# Patient Record
Sex: MALE | Race: WHITE | NOT HISPANIC OR LATINO | Employment: FULL TIME | URBAN - METROPOLITAN AREA
[De-identification: names, ages, dates, MRNs, and addresses within clinical notes are randomized per-mention and may not be internally consistent; named-entity substitution may affect disease eponyms.]

---

## 2023-08-16 ENCOUNTER — HOSPITAL ENCOUNTER (OUTPATIENT)
Dept: DATA CONVERSION | Facility: HOSPITAL | Age: 73
Discharge: HOME | End: 2023-08-16
Payer: MEDICARE

## 2023-08-16 DIAGNOSIS — Z79.899 OTHER LONG TERM (CURRENT) DRUG THERAPY: ICD-10-CM

## 2023-08-16 DIAGNOSIS — R20.0 ANESTHESIA OF SKIN: ICD-10-CM

## 2023-08-16 DIAGNOSIS — I67.1 CEREBRAL ANEURYSM, NONRUPTURED (HHS-HCC): ICD-10-CM

## 2023-08-16 LAB
ALBUMIN SERPL-MCNC: 3.6 GM/DL (ref 3.5–5)
ALBUMIN/GLOB SERPL: 1 RATIO (ref 1.5–3)
ALP BLD-CCNC: 80 U/L (ref 35–125)
ALT SERPL-CCNC: 26 U/L (ref 5–40)
ANION GAP SERPL CALCULATED.3IONS-SCNC: 10 MMOL/L (ref 0–19)
ANTICOAGULANT: NORMAL
AST SERPL-CCNC: 19 U/L (ref 5–40)
BACTERIA UR QL AUTO: NEGATIVE
BASOPHILS # BLD AUTO: 0.04 K/UL (ref 0–0.22)
BASOPHILS NFR BLD AUTO: 0.4 % (ref 0–1)
BILIRUB SERPL-MCNC: 1.9 MG/DL (ref 0.1–1.2)
BILIRUB UR QL STRIP.AUTO: NEGATIVE
BUN SERPL-MCNC: 24 MG/DL (ref 8–25)
BUN/CREAT SERPL: 17.1 RATIO (ref 8–21)
CALCIUM SERPL-MCNC: 8.6 MG/DL (ref 8.5–10.4)
CHLORIDE SERPL-SCNC: 95 MMOL/L (ref 97–107)
CLARITY UR: CLEAR
CO2 SERPL-SCNC: 22 MMOL/L (ref 24–31)
COLOR UR: YELLOW
CREAT SERPL-MCNC: 1.4 MG/DL (ref 0.4–1.6)
DEPRECATED RDW RBC AUTO: 43 FL (ref 37–54)
DIFFERENTIAL METHOD BLD: ABNORMAL
EOSINOPHIL # BLD AUTO: 0.03 K/UL (ref 0–0.45)
EOSINOPHIL NFR BLD: 0.3 % (ref 0–3)
ERYTHROCYTE [DISTWIDTH] IN BLOOD BY AUTOMATED COUNT: 12.6 % (ref 11.7–15)
GFR SERPL CREATININE-BSD FRML MDRD: 54 ML/MIN/1.73 M2
GLOBULIN SER-MCNC: 3.6 G/DL (ref 1.9–3.7)
GLUCOSE SERPL-MCNC: 113 MG/DL (ref 65–99)
GLUCOSE UR STRIP.AUTO-MCNC: NEGATIVE MG/DL
HCT VFR BLD AUTO: 32.4 % (ref 41–50)
HGB BLD-MCNC: 11.2 GM/DL (ref 13.5–16.5)
HGB UR QL STRIP.AUTO: ABNORMAL /HPF (ref 0–3)
HGB UR QL: NEGATIVE
HS TROPONIN T DELTA: 1 (ref 0–4)
HS TROPONIN T DELTA: NORMAL (ref 0–4)
HYALINE CASTS UR QL AUTO: ABNORMAL /LPF
IMM GRANULOCYTES # BLD AUTO: 0.07 K/UL (ref 0–0.1)
INR PPP: 1.1 (ref 0.86–1.16)
KETONES UR QL STRIP.AUTO: NEGATIVE
LEUKOCYTE ESTERASE UR QL STRIP.AUTO: NEGATIVE
LYMPHOCYTES # BLD AUTO: 0.77 K/UL (ref 1.2–3.2)
LYMPHOCYTES NFR BLD MANUAL: 8.6 % (ref 20–40)
MCH RBC QN AUTO: 32.5 PG (ref 26–34)
MCHC RBC AUTO-ENTMCNC: 34.6 % (ref 31–37)
MCV RBC AUTO: 93.9 FL (ref 80–100)
MICROSCOPIC (UA): ABNORMAL
MONOCYTES # BLD AUTO: 0.6 K/UL (ref 0–0.8)
MONOCYTES NFR BLD MANUAL: 6.7 % (ref 0–8)
NEUTROPHILS # BLD AUTO: 7.49 K/UL
NEUTROPHILS # BLD AUTO: 7.49 K/UL (ref 1.8–7.7)
NEUTROPHILS.IMMATURE NFR BLD: 0.8 % (ref 0–1)
NEUTS SEG NFR BLD: 83.2 % (ref 50–70)
NITRITE UR QL STRIP.AUTO: NEGATIVE
NRBC BLD-RTO: 0 /100 WBC
PH UR STRIP.AUTO: 5.5 [PH] (ref 4.6–8)
PLATELET # BLD AUTO: 342 K/UL (ref 150–450)
PMV BLD AUTO: 9.8 CU (ref 7–12.6)
POTASSIUM SERPL-SCNC: 3.9 MMOL/L (ref 3.4–5.1)
PROT SERPL-MCNC: 7.2 G/DL (ref 5.9–7.9)
PROT UR STRIP.AUTO-MCNC: NEGATIVE MG/DL
PROTHROMBIN TIME: 12 SEC (ref 9.3–12.7)
RBC # BLD AUTO: 3.45 M/UL (ref 4.5–5.5)
SODIUM SERPL-SCNC: 127 MMOL/L (ref 133–145)
SP GR UR STRIP.AUTO: 1.04 (ref 1–1.03)
SQUAMOUS UR QL AUTO: ABNORMAL /HPF
TROPONIN T SERPL-MCNC: 10 NG/L
TROPONIN T SERPL-MCNC: 9 NG/L
URINE CULTURE: ABNORMAL
UROBILINOGEN UR QL STRIP.AUTO: 2 MG/DL (ref 0–1)
WBC # BLD AUTO: 9 K/UL (ref 4.5–11)
WBC #/AREA URNS AUTO: ABNORMAL /HPF (ref 0–3)

## 2023-08-17 ENCOUNTER — HOSPITAL ENCOUNTER (OUTPATIENT)
Dept: DATA CONVERSION | Facility: HOSPITAL | Age: 73
Discharge: HOME | End: 2023-08-17
Payer: MEDICARE

## 2023-08-17 DIAGNOSIS — N40.1 BENIGN PROSTATIC HYPERPLASIA WITH LOWER URINARY TRACT SYMPTOMS: ICD-10-CM

## 2023-08-17 DIAGNOSIS — E55.9 VITAMIN D DEFICIENCY, UNSPECIFIED: ICD-10-CM

## 2023-08-17 DIAGNOSIS — E78.5 HYPERLIPIDEMIA, UNSPECIFIED: ICD-10-CM

## 2023-08-17 DIAGNOSIS — E03.9 HYPOTHYROIDISM, UNSPECIFIED: ICD-10-CM

## 2023-08-17 DIAGNOSIS — Z00.00 ENCOUNTER FOR GENERAL ADULT MEDICAL EXAMINATION WITHOUT ABNORMAL FINDINGS: ICD-10-CM

## 2023-08-17 DIAGNOSIS — R21 RASH AND OTHER NONSPECIFIC SKIN ERUPTION: ICD-10-CM

## 2023-08-17 DIAGNOSIS — I10 ESSENTIAL (PRIMARY) HYPERTENSION: ICD-10-CM

## 2023-08-17 DIAGNOSIS — R73.09 OTHER ABNORMAL GLUCOSE: ICD-10-CM

## 2023-08-17 LAB
25(OH)D3 SERPL-MCNC: 40 NG/ML (ref 31–100)
ALBUMIN SERPL-MCNC: 4.1 GM/DL (ref 3.5–5)
ALBUMIN/GLOB SERPL: 1 RATIO (ref 1.5–3)
ALP BLD-CCNC: 96 U/L (ref 35–125)
ALT SERPL-CCNC: 29 U/L (ref 5–40)
ANION GAP SERPL CALCULATED.3IONS-SCNC: 16 MMOL/L (ref 0–19)
APPEARANCE PLAS: ABNORMAL
AST SERPL-CCNC: 22 U/L (ref 5–40)
BASOPHILS # BLD AUTO: 0.03 K/UL (ref 0–0.22)
BASOPHILS NFR BLD AUTO: 0.3 % (ref 0–1)
BILIRUB SERPL-MCNC: 2.7 MG/DL (ref 0.1–1.2)
BUN SERPL-MCNC: 21 MG/DL (ref 8–25)
BUN/CREAT SERPL: 16.2 RATIO (ref 8–21)
CALCIUM SERPL-MCNC: 9.5 MG/DL (ref 8.5–10.4)
CHLORIDE SERPL-SCNC: 97 MMOL/L (ref 97–107)
CHOLEST SERPL-MCNC: 163 MG/DL (ref 133–200)
CHOLEST/HDLC SERPL: 5.8 RATIO
CO2 SERPL-SCNC: 21 MMOL/L (ref 24–31)
COLOR SPUN FLD: YELLOW
CREAT SERPL-MCNC: 1.3 MG/DL (ref 0.4–1.6)
DEPRECATED RDW RBC AUTO: 44.9 FL (ref 37–54)
DIFFERENTIAL METHOD BLD: ABNORMAL
EOSINOPHIL # BLD AUTO: 0.02 K/UL (ref 0–0.45)
EOSINOPHIL NFR BLD: 0.2 % (ref 0–3)
ERYTHROCYTE [DISTWIDTH] IN BLOOD BY AUTOMATED COUNT: 12.7 % (ref 11.7–15)
ERYTHROCYTE [SEDIMENTATION RATE] IN BLOOD BY WESTERGREN METHOD: 32 MM/HR (ref 0–20)
FASTING STATUS PATIENT QL REPORTED: ABNORMAL
GFR SERPL CREATININE-BSD FRML MDRD: 59 ML/MIN/1.73 M2
GLOBULIN SER-MCNC: 4.1 G/DL (ref 1.9–3.7)
GLUCOSE SERPL-MCNC: 111 MG/DL (ref 65–99)
HBA1C MFR BLD: 4.8 % (ref 4–6)
HCT VFR BLD AUTO: 39.4 % (ref 41–50)
HDLC SERPL-MCNC: 28 MG/DL
HGB BLD-MCNC: 13 GM/DL (ref 13.5–16.5)
IMM GRANULOCYTES # BLD AUTO: 0.09 K/UL (ref 0–0.1)
LDLC SERPL CALC-MCNC: 112 MG/DL (ref 65–130)
LYMPHOCYTES # BLD AUTO: 0.66 K/UL (ref 1.2–3.2)
LYMPHOCYTES NFR BLD MANUAL: 6.3 % (ref 20–40)
MCH RBC QN AUTO: 31.8 PG (ref 26–34)
MCHC RBC AUTO-ENTMCNC: 33 % (ref 31–37)
MCV RBC AUTO: 96.3 FL (ref 80–100)
MONOCYTES # BLD AUTO: 0.51 K/UL (ref 0–0.8)
MONOCYTES NFR BLD MANUAL: 4.9 % (ref 0–8)
NEUTROPHILS # BLD AUTO: 9.14 K/UL
NEUTROPHILS # BLD AUTO: 9.14 K/UL (ref 1.8–7.7)
NEUTROPHILS.IMMATURE NFR BLD: 0.9 % (ref 0–1)
NEUTS SEG NFR BLD: 87.4 % (ref 50–70)
NRBC BLD-RTO: 0 /100 WBC
PLATELET # BLD AUTO: 500 K/UL (ref 150–450)
PMV BLD AUTO: 10.6 CU (ref 7–12.6)
POTASSIUM SERPL-SCNC: 4.4 MMOL/L (ref 3.4–5.1)
PROT SERPL-MCNC: 8.2 G/DL (ref 5.9–7.9)
PSA SERPL-MCNC: 6 NG/ML (ref 0–4.1)
RBC # BLD AUTO: 4.09 M/UL (ref 4.5–5.5)
SODIUM SERPL-SCNC: 133 MMOL/L (ref 133–145)
TRIGL SERPL-MCNC: 113 MG/DL (ref 40–150)
TSH SERPL DL<=0.05 MIU/L-ACNC: 1.28 MIU/L (ref 0.27–4.2)
WBC # BLD AUTO: 10.5 K/UL (ref 4.5–11)

## 2023-08-23 LAB
B BURGDOR IGG SERPL QL IA: ABNORMAL
B BURGDOR IGG+IGM SER IA-IMP: ABNORMAL
B BURGDOR IGM SERPL QL IA: ABNORMAL
B BURGDOR.VLSE1+PEPC10 AB SER IA-ACNC: ABNORMAL

## 2023-09-16 VITALS
WEIGHT: 164 LBS | BODY MASS INDEX: 24.86 KG/M2 | RESPIRATION RATE: 18 BRPM | HEART RATE: 11 BPM | TEMPERATURE: 97.9 F | DIASTOLIC BLOOD PRESSURE: 66 MMHG | OXYGEN SATURATION: 99 % | SYSTOLIC BLOOD PRESSURE: 120 MMHG | HEIGHT: 68 IN

## 2023-10-03 PROBLEM — G51.0 BELL'S PALSY: Status: ACTIVE | Noted: 2023-10-03

## 2023-10-03 PROBLEM — Z90.89 ACQUIRED ABSENCE OF OTHER ORGANS: Status: ACTIVE | Noted: 2023-10-03

## 2023-10-03 PROBLEM — E83.52 HYPERCALCEMIA: Status: ACTIVE | Noted: 2023-10-03

## 2023-10-03 PROBLEM — Z86.0100 HISTORY OF COLONIC POLYPS: Status: ACTIVE | Noted: 2023-10-03

## 2023-10-03 PROBLEM — N40.1 BENIGN PROSTATIC HYPERPLASIA WITH URINARY OBSTRUCTION: Status: ACTIVE | Noted: 2023-10-03

## 2023-10-03 PROBLEM — E03.9 HYPOTHYROIDISM: Status: ACTIVE | Noted: 2023-10-03

## 2023-10-03 PROBLEM — Z86.010 HISTORY OF COLONIC POLYPS: Status: ACTIVE | Noted: 2023-10-03

## 2023-10-03 PROBLEM — N13.8 BENIGN PROSTATIC HYPERPLASIA WITH URINARY OBSTRUCTION: Status: ACTIVE | Noted: 2023-10-03

## 2023-10-03 PROBLEM — N50.1 VASCULAR DISORDER OF MALE GENITAL ORGANS: Status: ACTIVE | Noted: 2023-10-03

## 2023-10-03 PROBLEM — E80.6 DISORDER OF BILIRUBIN EXCRETION: Status: ACTIVE | Noted: 2023-10-03

## 2023-10-03 PROBLEM — E55.9 VITAMIN D DEFICIENCY: Status: ACTIVE | Noted: 2023-10-03

## 2023-10-03 PROBLEM — E78.5 HYPERLIPIDEMIA: Status: ACTIVE | Noted: 2023-10-03

## 2023-10-03 PROBLEM — I63.9 CEREBROVASCULAR ACCIDENT (MULTI): Status: ACTIVE | Noted: 2023-10-03

## 2023-10-03 PROBLEM — I72.9 ANEURYSM (CMS-HCC): Status: ACTIVE | Noted: 2023-10-03

## 2023-10-03 PROBLEM — I10 ESSENTIAL HYPERTENSION: Status: ACTIVE | Noted: 2023-10-03

## 2023-10-03 PROBLEM — K40.90 LEFT INGUINAL HERNIA: Status: ACTIVE | Noted: 2023-10-03

## 2023-10-03 PROBLEM — R73.09 OTHER ABNORMAL GLUCOSE: Status: ACTIVE | Noted: 2023-10-03

## 2023-10-03 RX ORDER — LISINOPRIL AND HYDROCHLOROTHIAZIDE 10; 12.5 MG/1; MG/1
1 TABLET ORAL DAILY
COMMUNITY
Start: 2018-04-30 | End: 2023-10-03 | Stop reason: SDUPTHER

## 2023-10-03 RX ORDER — ERGOCALCIFEROL 1.25 MG/1
1 CAPSULE ORAL
COMMUNITY
Start: 2019-05-07 | End: 2023-11-14 | Stop reason: ALTCHOICE

## 2023-10-03 RX ORDER — LEVOTHYROXINE SODIUM 88 UG/1
88 TABLET ORAL
COMMUNITY
Start: 2018-04-30 | End: 2024-02-07

## 2023-10-03 RX ORDER — DOXYCYCLINE 100 MG/1
CAPSULE ORAL
COMMUNITY
Start: 2023-08-17 | End: 2023-11-14 | Stop reason: ALTCHOICE

## 2023-10-16 ENCOUNTER — OFFICE VISIT (OUTPATIENT)
Dept: NEUROSURGERY | Facility: HOSPITAL | Age: 73
End: 2023-10-16
Payer: MEDICARE

## 2023-10-16 ENCOUNTER — LAB (OUTPATIENT)
Dept: LAB | Facility: LAB | Age: 73
End: 2023-10-16
Payer: MEDICARE

## 2023-10-16 VITALS
TEMPERATURE: 97.9 F | HEART RATE: 97 BPM | BODY MASS INDEX: 25.61 KG/M2 | WEIGHT: 163.14 LBS | RESPIRATION RATE: 17 BRPM | HEIGHT: 67 IN | SYSTOLIC BLOOD PRESSURE: 130 MMHG | DIASTOLIC BLOOD PRESSURE: 83 MMHG

## 2023-10-16 DIAGNOSIS — E07.89 OTHER SPECIFIED DISORDERS OF THYROID: ICD-10-CM

## 2023-10-16 DIAGNOSIS — I67.1 CEREBRAL ARTERIAL ANEURYSM (HHS-HCC): ICD-10-CM

## 2023-10-16 DIAGNOSIS — I67.1 CEREBRAL ARTERIAL ANEURYSM (HHS-HCC): Primary | ICD-10-CM

## 2023-10-16 LAB
ALBUMIN SERPL BCP-MCNC: 4.6 G/DL (ref 3.4–5)
ANION GAP SERPL CALC-SCNC: 15 MMOL/L (ref 10–20)
APTT PPP: 31 SECONDS (ref 27–38)
BUN SERPL-MCNC: 14 MG/DL (ref 6–23)
CALCIUM SERPL-MCNC: 10.4 MG/DL (ref 8.6–10.6)
CHLORIDE SERPL-SCNC: 105 MMOL/L (ref 98–107)
CO2 SERPL-SCNC: 25 MMOL/L (ref 21–32)
CREAT SERPL-MCNC: 1.1 MG/DL (ref 0.5–1.3)
ERYTHROCYTE [DISTWIDTH] IN BLOOD BY AUTOMATED COUNT: 12 % (ref 11.5–14.5)
GFR SERPL CREATININE-BSD FRML MDRD: 72 ML/MIN/1.73M*2
GLUCOSE SERPL-MCNC: 101 MG/DL (ref 74–99)
HCT VFR BLD AUTO: 42.1 % (ref 41–52)
HGB BLD-MCNC: 14.4 G/DL (ref 13.5–17.5)
INR PPP: 1.1 (ref 0.9–1.1)
MCH RBC QN AUTO: 32.7 PG (ref 26–34)
MCHC RBC AUTO-ENTMCNC: 34.2 G/DL (ref 32–36)
MCV RBC AUTO: 96 FL (ref 80–100)
NRBC BLD-RTO: 0 /100 WBCS (ref 0–0)
PHOSPHATE SERPL-MCNC: 3.7 MG/DL (ref 2.5–4.9)
PLATELET # BLD AUTO: 361 X10*3/UL (ref 150–450)
PMV BLD AUTO: 11 FL (ref 7.5–11.5)
POTASSIUM SERPL-SCNC: 3.9 MMOL/L (ref 3.5–5.3)
PROTHROMBIN TIME: 12.6 SECONDS (ref 9.8–12.8)
RBC # BLD AUTO: 4.41 X10*6/UL (ref 4.5–5.9)
SODIUM SERPL-SCNC: 141 MMOL/L (ref 136–145)
WBC # BLD AUTO: 5.6 X10*3/UL (ref 4.4–11.3)

## 2023-10-16 PROCEDURE — 36415 COLL VENOUS BLD VENIPUNCTURE: CPT

## 2023-10-16 PROCEDURE — 85730 THROMBOPLASTIN TIME PARTIAL: CPT

## 2023-10-16 PROCEDURE — 99203 OFFICE O/P NEW LOW 30 MIN: CPT | Performed by: NURSE PRACTITIONER

## 2023-10-16 PROCEDURE — 85610 PROTHROMBIN TIME: CPT

## 2023-10-16 PROCEDURE — 1159F MED LIST DOCD IN RCRD: CPT | Performed by: NURSE PRACTITIONER

## 2023-10-16 PROCEDURE — 3079F DIAST BP 80-89 MM HG: CPT | Performed by: NURSE PRACTITIONER

## 2023-10-16 PROCEDURE — 80069 RENAL FUNCTION PANEL: CPT

## 2023-10-16 PROCEDURE — 3075F SYST BP GE 130 - 139MM HG: CPT | Performed by: NURSE PRACTITIONER

## 2023-10-16 PROCEDURE — 99213 OFFICE O/P EST LOW 20 MIN: CPT | Performed by: NURSE PRACTITIONER

## 2023-10-16 PROCEDURE — 85027 COMPLETE CBC AUTOMATED: CPT

## 2023-10-16 NOTE — PROGRESS NOTES
"Chief Complaint:   Cerebral aneurysm      History Of Present Illness:    Chidi Campos is a 72-year-old male with a PMH significant for HTN, COVID recovered, hypothyroidism, vitamin D deficiency who presents to clinic for further evaluation for incidental finding of cerebral aneurysm. Patient presented to [facility] on 08/16/23 for evaluation of right facial droop concerning for acute stroke. Patient was found to have 2mm x 3mm right MCA trifurcation aneurysm as well as 2.5mm R PCA infundibulum. Patient was diagnosed with bells palsy and recommended for neurosurgery and neurology follow up. Patient presents to clinic for NPV.     Nonsmoker. No familial history of cerebral aneurysm. BP controlled on current medication regimen. Intermittent mild headaches but improved now that his BP is under control. No recent vision changes or falls.        Last Recorded Vitals:  Vitals:    10/16/23 1358   BP: 130/83   Pulse: 97   Resp: 17   Temp: 36.6 °C (97.9 °F)   Weight: 74 kg (163 lb 2.3 oz)   Height: 1.702 m (5' 7\")        Past Medical History:   has no past medical history on file.    Past Surgical History:    has no past surgical history on file.    Outpatient Medications:  Current Outpatient Medications   Medication Instructions    BABY ASPIRIN ORAL Baby Aspirin    doxycycline (Vibramycin) 100 mg capsule oral    ergocalciferol (Vitamin D-2) 1.25 MG (15497 UT) capsule 1 capsule, oral, Weekly    levothyroxine (SYNTHROID, LEVOXYL) 88 mcg, oral, Daily before breakfast    lisinopriL-hydrochlorothiazide 10-12.5 mg tablet 1 tablet, oral, Daily    methylPREDNISolone (Medrol Dospak) 4 mg tablets TAKE 6 TABLETS BY MOUTH ON DAY #1, 5 TABLETS ON DAY #2, 4 TABLETS ON DAY #3, 3 TABLETS ON DAY #4, 2 TABLETS ON DAY #5, 1 TABLET ON DAY #6, THEN STOP    valACYclovir (Valtrex) 1 gram tablet TAKE 1 TABLET BY MOUTH THREE TIMES A DAY       Physical Exam:  A&Ox3   Fluent speech   RIVERA: strength 5/5; no drift   EOMI   Gait normal     Relevant " Results:   CTA 08/16 with R MCA  aneurysm measuring 2mm x 3mm.       Assessment/Plan   The encounter diagnosis was Cerebral arterial aneurysm.    Patient is a 72-year-old male with an incidental finding PMH significant for HTN, COVID recovered, hypothyroidism, vitamin D deficiency who presents to clinic for further evaluation for incidental finding of cerebral aneurysm. Patient presented to [facility] on 08/16/23 for evaluation of right facial droop concerning for acute stroke. Patient was found to have 2mm x 3mm right MCA trifurcation aneurysm as well as 2.5mm R PCA infundibulum. He was diagnosed with bells palsy and recommended for neurosurgery and neurology follow up. No known familial history of aneurysm. Patient is a nonsmoker. Recommend patient undergo angiogram to better define aneurysm. Discussed with patient if he was to develop a thunderclap headache or WHOL to present to the ED for further evaluation. Will review case at  once angio is completed.     Patient and wife were in agreement. All questions answered.

## 2023-10-26 DIAGNOSIS — I72.9 ANEURYSM (CMS-HCC): Primary | ICD-10-CM

## 2023-11-14 ENCOUNTER — APPOINTMENT (OUTPATIENT)
Dept: RADIOLOGY | Facility: HOSPITAL | Age: 73
DRG: 494 | End: 2023-11-14
Payer: MEDICARE

## 2023-11-14 ENCOUNTER — HOSPITAL ENCOUNTER (INPATIENT)
Facility: HOSPITAL | Age: 73
LOS: 2 days | Discharge: HOME | DRG: 494 | End: 2023-11-16
Attending: STUDENT IN AN ORGANIZED HEALTH CARE EDUCATION/TRAINING PROGRAM | Admitting: HOSPITALIST
Payer: MEDICARE

## 2023-11-14 DIAGNOSIS — S82.851A CLOSED TRIMALLEOLAR FRACTURE OF RIGHT ANKLE, INITIAL ENCOUNTER: ICD-10-CM

## 2023-11-14 DIAGNOSIS — S82.451A DISPLACED COMMINUTED FRACTURE OF SHAFT OF RIGHT FIBULA, INITIAL ENCOUNTER FOR CLOSED FRACTURE: Primary | ICD-10-CM

## 2023-11-14 DIAGNOSIS — S82.851S CLOSED TRIMALLEOLAR FRACTURE OF RIGHT ANKLE, SEQUELA: ICD-10-CM

## 2023-11-14 LAB
ALBUMIN SERPL-MCNC: 3.9 G/DL (ref 3.5–5)
ALP BLD-CCNC: 59 U/L (ref 35–125)
ALT SERPL-CCNC: 15 U/L (ref 5–40)
ANION GAP SERPL CALC-SCNC: 11 MMOL/L
AST SERPL-CCNC: 19 U/L (ref 5–40)
BASOPHILS # BLD AUTO: 0.03 X10*3/UL (ref 0–0.1)
BASOPHILS NFR BLD AUTO: 0.3 %
BILIRUB SERPL-MCNC: 1.5 MG/DL (ref 0.1–1.2)
BUN SERPL-MCNC: 14 MG/DL (ref 8–25)
CALCIUM SERPL-MCNC: 8.7 MG/DL (ref 8.5–10.4)
CHLORIDE SERPL-SCNC: 101 MMOL/L (ref 97–107)
CO2 SERPL-SCNC: 24 MMOL/L (ref 24–31)
CREAT SERPL-MCNC: 1.1 MG/DL (ref 0.4–1.6)
EOSINOPHIL # BLD AUTO: 0.01 X10*3/UL (ref 0–0.4)
EOSINOPHIL NFR BLD AUTO: 0.1 %
ERYTHROCYTE [DISTWIDTH] IN BLOOD BY AUTOMATED COUNT: 11.9 % (ref 11.5–14.5)
GFR SERPL CREATININE-BSD FRML MDRD: 71 ML/MIN/1.73M*2
GLUCOSE SERPL-MCNC: 106 MG/DL (ref 65–99)
HCT VFR BLD AUTO: 37.9 % (ref 41–52)
HGB BLD-MCNC: 13 G/DL (ref 13.5–17.5)
IMM GRANULOCYTES # BLD AUTO: 0.05 X10*3/UL (ref 0–0.5)
IMM GRANULOCYTES NFR BLD AUTO: 0.4 % (ref 0–0.9)
LYMPHOCYTES # BLD AUTO: 0.56 X10*3/UL (ref 0.8–3)
LYMPHOCYTES NFR BLD AUTO: 4.7 %
MCH RBC QN AUTO: 32.7 PG (ref 26–34)
MCHC RBC AUTO-ENTMCNC: 34.3 G/DL (ref 32–36)
MCV RBC AUTO: 96 FL (ref 80–100)
MONOCYTES # BLD AUTO: 0.68 X10*3/UL (ref 0.05–0.8)
MONOCYTES NFR BLD AUTO: 5.7 %
NEUTROPHILS # BLD AUTO: 10.56 X10*3/UL (ref 1.6–5.5)
NEUTROPHILS NFR BLD AUTO: 88.8 %
NRBC BLD-RTO: 0 /100 WBCS (ref 0–0)
PLATELET # BLD AUTO: 315 X10*3/UL (ref 150–450)
POTASSIUM SERPL-SCNC: 3.8 MMOL/L (ref 3.4–5.1)
PROT SERPL-MCNC: 6.5 G/DL (ref 5.9–7.9)
RBC # BLD AUTO: 3.97 X10*6/UL (ref 4.5–5.9)
SARS-COV-2 RNA RESP QL NAA+PROBE: NOT DETECTED
SODIUM SERPL-SCNC: 136 MMOL/L (ref 133–145)
WBC # BLD AUTO: 11.9 X10*3/UL (ref 4.4–11.3)

## 2023-11-14 PROCEDURE — 99285 EMERGENCY DEPT VISIT HI MDM: CPT | Mod: 25,27 | Performed by: STUDENT IN AN ORGANIZED HEALTH CARE EDUCATION/TRAINING PROGRAM

## 2023-11-14 PROCEDURE — 85025 COMPLETE CBC W/AUTO DIFF WBC: CPT | Performed by: PHYSICIAN ASSISTANT

## 2023-11-14 PROCEDURE — 1100000001 HC PRIVATE ROOM DAILY

## 2023-11-14 PROCEDURE — 36415 COLL VENOUS BLD VENIPUNCTURE: CPT | Performed by: PHYSICIAN ASSISTANT

## 2023-11-14 PROCEDURE — 2500000001 HC RX 250 WO HCPCS SELF ADMINISTERED DRUGS (ALT 637 FOR MEDICARE OP): Performed by: HOSPITALIST

## 2023-11-14 PROCEDURE — 2500000004 HC RX 250 GENERAL PHARMACY W/ HCPCS (ALT 636 FOR OP/ED): Performed by: PHYSICIAN ASSISTANT

## 2023-11-14 PROCEDURE — 73700 CT LOWER EXTREMITY W/O DYE: CPT | Mod: RT

## 2023-11-14 PROCEDURE — 96375 TX/PRO/DX INJ NEW DRUG ADDON: CPT | Mod: 59

## 2023-11-14 PROCEDURE — 94760 N-INVAS EAR/PLS OXIMETRY 1: CPT

## 2023-11-14 PROCEDURE — 73610 X-RAY EXAM OF ANKLE: CPT | Mod: RT,FY

## 2023-11-14 PROCEDURE — 2500000004 HC RX 250 GENERAL PHARMACY W/ HCPCS (ALT 636 FOR OP/ED): Performed by: STUDENT IN AN ORGANIZED HEALTH CARE EDUCATION/TRAINING PROGRAM

## 2023-11-14 PROCEDURE — 80053 COMPREHEN METABOLIC PANEL: CPT | Performed by: PHYSICIAN ASSISTANT

## 2023-11-14 PROCEDURE — 27840 TREAT ANKLE DISLOCATION: CPT | Performed by: PHYSICIAN ASSISTANT

## 2023-11-14 PROCEDURE — 87635 SARS-COV-2 COVID-19 AMP PRB: CPT | Performed by: HOSPITALIST

## 2023-11-14 PROCEDURE — 96365 THER/PROPH/DIAG IV INF INIT: CPT | Mod: 59

## 2023-11-14 RX ORDER — POLYETHYLENE GLYCOL 3350 17 G/17G
17 POWDER, FOR SOLUTION ORAL DAILY
Status: DISCONTINUED | OUTPATIENT
Start: 2023-11-14 | End: 2023-11-16 | Stop reason: HOSPADM

## 2023-11-14 RX ORDER — MORPHINE SULFATE 4 MG/ML
4 INJECTION, SOLUTION INTRAMUSCULAR; INTRAVENOUS ONCE
Status: COMPLETED | OUTPATIENT
Start: 2023-11-14 | End: 2023-11-14

## 2023-11-14 RX ORDER — LEVOTHYROXINE SODIUM 88 UG/1
88 TABLET ORAL
Status: DISCONTINUED | OUTPATIENT
Start: 2023-11-15 | End: 2023-11-16 | Stop reason: HOSPADM

## 2023-11-14 RX ORDER — PROPOFOL 10 MG/ML
INJECTION, EMULSION INTRAVENOUS CODE/TRAUMA/SEDATION MEDICATION
Status: COMPLETED | OUTPATIENT
Start: 2023-11-14 | End: 2023-11-14

## 2023-11-14 RX ORDER — ACETAMINOPHEN 325 MG/1
650 TABLET ORAL EVERY 6 HOURS PRN
Status: DISCONTINUED | OUTPATIENT
Start: 2023-11-14 | End: 2023-11-16 | Stop reason: HOSPADM

## 2023-11-14 RX ORDER — OXYCODONE HYDROCHLORIDE 5 MG/1
5 TABLET ORAL EVERY 4 HOURS PRN
Status: DISCONTINUED | OUTPATIENT
Start: 2023-11-14 | End: 2023-11-16 | Stop reason: HOSPADM

## 2023-11-14 RX ORDER — ONDANSETRON HYDROCHLORIDE 2 MG/ML
4 INJECTION, SOLUTION INTRAVENOUS EVERY 4 HOURS PRN
Status: DISCONTINUED | OUTPATIENT
Start: 2023-11-14 | End: 2023-11-16 | Stop reason: HOSPADM

## 2023-11-14 RX ORDER — CEFAZOLIN SODIUM 2 G/100ML
2 INJECTION, SOLUTION INTRAVENOUS ONCE
Status: COMPLETED | OUTPATIENT
Start: 2023-11-14 | End: 2023-11-14

## 2023-11-14 RX ORDER — HYDROMORPHONE HYDROCHLORIDE 1 MG/ML
1 INJECTION, SOLUTION INTRAMUSCULAR; INTRAVENOUS; SUBCUTANEOUS
Status: DISCONTINUED | OUTPATIENT
Start: 2023-11-14 | End: 2023-11-16 | Stop reason: HOSPADM

## 2023-11-14 RX ORDER — PROPOFOL 10 MG/ML
70 INJECTION, EMULSION INTRAVENOUS ONCE
Status: COMPLETED | OUTPATIENT
Start: 2023-11-14 | End: 2023-11-14

## 2023-11-14 RX ADMIN — OXYCODONE HYDROCHLORIDE 5 MG: 5 TABLET ORAL at 21:33

## 2023-11-14 RX ADMIN — OXYCODONE HYDROCHLORIDE 5 MG: 5 TABLET ORAL at 17:37

## 2023-11-14 RX ADMIN — MORPHINE SULFATE 4 MG: 4 INJECTION, SOLUTION INTRAMUSCULAR; INTRAVENOUS at 12:47

## 2023-11-14 RX ADMIN — PROPOFOL 70 MG: 10 INJECTION, EMULSION INTRAVENOUS at 13:43

## 2023-11-14 RX ADMIN — SODIUM CHLORIDE 1000 ML: 900 INJECTION, SOLUTION INTRAVENOUS at 12:47

## 2023-11-14 RX ADMIN — PROPOFOL 70 MG: 10 INJECTION, EMULSION INTRAVENOUS at 13:44

## 2023-11-14 RX ADMIN — ACETAMINOPHEN 650 MG: 325 TABLET ORAL at 16:53

## 2023-11-14 RX ADMIN — CEFAZOLIN SODIUM 2 G: 2 INJECTION, SOLUTION INTRAVENOUS at 13:45

## 2023-11-14 SDOH — SOCIAL STABILITY: SOCIAL INSECURITY: WERE YOU ABLE TO COMPLETE ALL THE BEHAVIORAL HEALTH SCREENINGS?: YES

## 2023-11-14 SDOH — SOCIAL STABILITY: SOCIAL INSECURITY: ABUSE: ADULT

## 2023-11-14 SDOH — SOCIAL STABILITY: SOCIAL INSECURITY: DO YOU FEEL UNSAFE GOING BACK TO THE PLACE WHERE YOU ARE LIVING?: NO

## 2023-11-14 SDOH — SOCIAL STABILITY: SOCIAL INSECURITY: HAVE YOU HAD THOUGHTS OF HARMING ANYONE ELSE?: NO

## 2023-11-14 SDOH — SOCIAL STABILITY: SOCIAL INSECURITY: HAS ANYONE EVER THREATENED TO HURT YOUR FAMILY OR YOUR PETS?: NO

## 2023-11-14 SDOH — SOCIAL STABILITY: SOCIAL INSECURITY: ARE THERE ANY APPARENT SIGNS OF INJURIES/BEHAVIORS THAT COULD BE RELATED TO ABUSE/NEGLECT?: NO

## 2023-11-14 SDOH — SOCIAL STABILITY: SOCIAL INSECURITY: DOES ANYONE TRY TO KEEP YOU FROM HAVING/CONTACTING OTHER FRIENDS OR DOING THINGS OUTSIDE YOUR HOME?: NO

## 2023-11-14 SDOH — SOCIAL STABILITY: SOCIAL INSECURITY: ARE YOU OR HAVE YOU BEEN THREATENED OR ABUSED PHYSICALLY, EMOTIONALLY, OR SEXUALLY BY ANYONE?: NO

## 2023-11-14 SDOH — HEALTH STABILITY: MENTAL HEALTH: EXPERIENCED ANY OF THE FOLLOWING LIFE EVENTS: SERIOUS ACCIDENT AT HOME OR WORK

## 2023-11-14 SDOH — SOCIAL STABILITY: SOCIAL INSECURITY: DO YOU FEEL ANYONE HAS EXPLOITED OR TAKEN ADVANTAGE OF YOU FINANCIALLY OR OF YOUR PERSONAL PROPERTY?: NO

## 2023-11-14 ASSESSMENT — ACTIVITIES OF DAILY LIVING (ADL)
JUDGMENT_ADEQUATE_SAFELY_COMPLETE_DAILY_ACTIVITIES: YES
WALKS IN HOME: INDEPENDENT
DRESSING YOURSELF: INDEPENDENT
PATIENT'S MEMORY ADEQUATE TO SAFELY COMPLETE DAILY ACTIVITIES?: YES
HEARING - LEFT EAR: FUNCTIONAL
HEARING - RIGHT EAR: FUNCTIONAL
LACK_OF_TRANSPORTATION: NO
BATHING: INDEPENDENT
ADEQUATE_TO_COMPLETE_ADL: YES
ASSISTIVE_DEVICE: SPLINT RLE
GROOMING: INDEPENDENT
FEEDING YOURSELF: INDEPENDENT
TOILETING: INDEPENDENT

## 2023-11-14 ASSESSMENT — COLUMBIA-SUICIDE SEVERITY RATING SCALE - C-SSRS
1. IN THE PAST MONTH, HAVE YOU WISHED YOU WERE DEAD OR WISHED YOU COULD GO TO SLEEP AND NOT WAKE UP?: NO
6. HAVE YOU EVER DONE ANYTHING, STARTED TO DO ANYTHING, OR PREPARED TO DO ANYTHING TO END YOUR LIFE?: NO
2. HAVE YOU ACTUALLY HAD ANY THOUGHTS OF KILLING YOURSELF?: NO

## 2023-11-14 ASSESSMENT — COGNITIVE AND FUNCTIONAL STATUS - GENERAL
DAILY ACTIVITIY SCORE: 24
DRESSING REGULAR LOWER BODY CLOTHING: A LITTLE
MOBILITY SCORE: 24
CLIMB 3 TO 5 STEPS WITH RAILING: A LOT
DAILY ACTIVITIY SCORE: 22
PATIENT BASELINE BEDBOUND: NO
MOBILITY SCORE: 21
WALKING IN HOSPITAL ROOM: A LITTLE
TOILETING: A LITTLE

## 2023-11-14 ASSESSMENT — LIFESTYLE VARIABLES
HOW OFTEN DO YOU HAVE 6 OR MORE DRINKS ON ONE OCCASION: NEVER
PRESCIPTION_ABUSE_PAST_12_MONTHS: NO
AUDIT-C TOTAL SCORE: 0
HAVE YOU EVER FELT YOU SHOULD CUT DOWN ON YOUR DRINKING: NO
HOW MANY STANDARD DRINKS CONTAINING ALCOHOL DO YOU HAVE ON A TYPICAL DAY: PATIENT DOES NOT DRINK
SKIP TO QUESTIONS 9-10: 1
HAVE PEOPLE ANNOYED YOU BY CRITICIZING YOUR DRINKING: NO
SUBSTANCE_ABUSE_PAST_12_MONTHS: NO
PRESCIPTION_ABUSE_PAST_12_MONTHS: NO
HOW OFTEN DO YOU HAVE A DRINK CONTAINING ALCOHOL: NEVER
HOW MANY STANDARD DRINKS CONTAINING ALCOHOL DO YOU HAVE ON A TYPICAL DAY: PATIENT DOES NOT DRINK
REASON UNABLE TO ASSESS: NO
SUBSTANCE_ABUSE_PAST_12_MONTHS: NO
AUDIT-C TOTAL SCORE: 0
AUDIT-C TOTAL SCORE: 0
HOW OFTEN DO YOU HAVE A DRINK CONTAINING ALCOHOL: NEVER
EVER HAD A DRINK FIRST THING IN THE MORNING TO STEADY YOUR NERVES TO GET RID OF A HANGOVER: NO
HOW OFTEN DO YOU HAVE 6 OR MORE DRINKS ON ONE OCCASION: NEVER
SKIP TO QUESTIONS 9-10: 1
AUDIT-C TOTAL SCORE: 0
EVER FELT BAD OR GUILTY ABOUT YOUR DRINKING: NO

## 2023-11-14 ASSESSMENT — PAIN SCALES - GENERAL
PAINLEVEL_OUTOF10: 8
PAINLEVEL_OUTOF10: 5 - MODERATE PAIN
PAINLEVEL_OUTOF10: 9
PAINLEVEL_OUTOF10: 3
PAINLEVEL_OUTOF10: 9
PAINLEVEL_OUTOF10: 8
PAINLEVEL_OUTOF10: 5 - MODERATE PAIN
PAINLEVEL_OUTOF10: 9

## 2023-11-14 ASSESSMENT — PAIN DESCRIPTION - PAIN TYPE: TYPE: ACUTE PAIN

## 2023-11-14 ASSESSMENT — PATIENT HEALTH QUESTIONNAIRE - PHQ9
SUM OF ALL RESPONSES TO PHQ9 QUESTIONS 1 & 2: 0
1. LITTLE INTEREST OR PLEASURE IN DOING THINGS: NOT AT ALL
1. LITTLE INTEREST OR PLEASURE IN DOING THINGS: NOT AT ALL
2. FEELING DOWN, DEPRESSED OR HOPELESS: NOT AT ALL

## 2023-11-14 ASSESSMENT — PAIN - FUNCTIONAL ASSESSMENT
PAIN_FUNCTIONAL_ASSESSMENT: 0-10

## 2023-11-14 ASSESSMENT — PAIN DESCRIPTION - LOCATION
LOCATION: FOOT
LOCATION: ANKLE
LOCATION: ANKLE

## 2023-11-14 ASSESSMENT — PAIN DESCRIPTION - ORIENTATION
ORIENTATION: RIGHT

## 2023-11-14 ASSESSMENT — PAIN DESCRIPTION - DESCRIPTORS: DESCRIPTORS: ACHING

## 2023-11-14 NOTE — NURSING NOTE
Patient resting quietly in bed.  Has been medicated for pain with somewhat effective results.  Elevated on pillows and ice pack in place per Dr Bae verbal orders to nurse during consultation.  Encouraged patient to stay up on pain and notify staff with complaints of pain.

## 2023-11-14 NOTE — ED PROVIDER NOTES
HPI   Chief Complaint   Patient presents with    Ankle Pain     Right ankle pain       HPI  73-year-old male here for right ankle injury, he was moving some lumber and wood.  A piece fell and landed on his right foot and ankle, patient has obvious deformity of the right ankle.  No other area of pain or injury.                  [place] Coma Scale Score: 15                  Patient History   No past medical history on file.  No past surgical history on file.  Family History   Problem Relation Name Age of Onset    Hypertension Mother      Hyperlipidemia Mother      Lung cancer Father       Social History     Tobacco Use    Smoking status: Not on file    Smokeless tobacco: Not on file   Substance Use Topics    Alcohol use: Not on file    Drug use: Not on file       Physical Exam   ED Triage Vitals [11/14/23 1148]   Temp Heart Rate Resp BP   36.6 °C (97.9 °F) 79 18 116/80      SpO2 Temp Source Heart Rate Source Patient Position   98 % Oral Monitor Lying      BP Location FiO2 (%)     Right arm --       Physical Exam  PHYSICAL EXAMINATION    GENERAL APPEARANCE: Awake and alert.     VITAL SIGNS: As per the nurses' triage record.     HEENT: Normocephalic, atraumatic. Extraocular muscles are intact. Pupils equal round and reactive to light. Conjunctiva are pink. Negative scleral icterus. Mucous membranes are moist. Tongue in the midline. Pharynx was without erythema or exudates, uvula midline    NECK: Soft Nontender and supple, full gross ROM, no meningeal signs.    CHEST: Nontender to palpation. Clear to auscultation bilaterally. No rales, rhonchi, or wheezing.     HEART: S1, S2. Regular rate and rhythm. No murmurs, gallops or rubs.  Strong and equal pulses in the extremities.     ABDOMEN: Soft, nontender, nondistended, positive bowel sounds, no palpable masses.    MUSCULOSKELETAL: Obvious deformity right ankle distal pulses intact.  Distal sensation and function of the toes intact.     NEUROLOGICAL: Awake, alert and  oriented x 3. Power intact in the upper and lower extremities. Sensation is intact to light touch in the upper and lower extremities.     IMMUNOLOGICAL: No lymphatic streaking noted     DERM: No petechiae, rashes, or ecchymoses.  ED Course & MDM   ED Course as of 11/14/23 1502   Tue Nov 14, 2023   1302 I have consulted with Dr. Daniel foot and ankle specialist, he has indicated the request that we attempt closed reduction and then splint, order CT scan, hospitalized to medicine service and tentative surgery for tomorrow.  He requested that I send a photograph of the x-ray, I spoke to the patient with the patient's female  as well as to ED staff members as witness in the room and they stated that they were agreeable if I send a photograph via text message to the foot and ankle specialist without any identifying factors other than the photo itself.  They were agreeable to this.  As far as patient's best interest this assisted in the foot and ankle specialist in preparing definitive management [AP]   1320 Consented patient for sedation and reduction [AP]   1354 Tolerated sedation very well, reduction went well with no complications, postreduction films ordered [AP]      ED Course User Index  [AP] Vu Dominique PA-C         Diagnoses as of 11/14/23 1502   Closed trimalleolar fracture of right ankle, initial encounter   Displaced comminuted fracture of shaft of right fibula, initial encounter for closed fracture       Medical Decision Making  Patient has been seen in conjunction with attending physician Dr. Ng    Parts of this chart have been completed using voice recognition software. Please excuse any errors of transcription.  My thought process and reason for plan has been formulated from the time that I saw the patient until the time of disposition and is not specific to one specific moment during their visit and furthermore my MDM encompasses this entire chart and not only this text box.      HPI:  Detailed above.    Exam: A medically appropriate exam performed, outlined above, given the known history and presentation.    History obtained from: Patient    EKG: Obtained read by attending physician reviewed myself    Social Determinants of Health considered during this visit: Lives at home    Medications given during visit:  Medications   sodium chloride 0.9 % bolus 1,000 mL (0 mL intravenous Stopped 11/14/23 1317)   morphine injection 4 mg (4 mg intravenous Given 11/14/23 1247)   ceFAZolin in dextrose (iso-os) (Ancef) IVPB 2 g (0 g intravenous Stopped 11/14/23 1415)   propofol (Diprivan) infusion 70 mg (0 mg intravenous Stopped 11/14/23 1357)   propofol (Diprivan) injection (70 mg intravenous Given 11/14/23 1343)        Diagnostic/tests  Labs Reviewed   CBC WITH AUTO DIFFERENTIAL - Abnormal       Result Value    WBC 11.9 (*)     nRBC 0.0      RBC 3.97 (*)     Hemoglobin 13.0 (*)     Hematocrit 37.9 (*)     MCV 96      MCH 32.7      MCHC 34.3      RDW 11.9      Platelets 315      Neutrophils % 88.8      Immature Granulocytes %, Automated 0.4      Lymphocytes % 4.7      Monocytes % 5.7      Eosinophils % 0.1      Basophils % 0.3      Neutrophils Absolute 10.56 (*)     Immature Granulocytes Absolute, Automated 0.05      Lymphocytes Absolute 0.56 (*)     Monocytes Absolute 0.68      Eosinophils Absolute 0.01      Basophils Absolute 0.03     COMPREHENSIVE METABOLIC PANEL - Abnormal    Glucose 106 (*)     Sodium 136      Potassium 3.8      Chloride 101      Bicarbonate 24      Urea Nitrogen 14      Creatinine 1.10      eGFR 71      Calcium 8.7      Albumin 3.9      Alkaline Phosphatase 59      Total Protein 6.5      AST 19      Bilirubin, Total 1.5 (*)     ALT 15      Anion Gap 11        XR ankle right 3+ views   Final Result   Complete dislocation of the right ankle, tibiotalar joint. Displaced   and angulated fracture through the distal diaphysis of the right   fibula. Probable trimalleolar fracture although  limited on plain film   evaluation, consider cross-sectional imaging as deemed clinically   warranted.        Signed by: Anabaptist Artmarzena 11/14/2023 1:43 PM   Dictation workstation:   [id number]      XR ankle right 3+ views    (Results Pending)   CT ankle right wo IV contrast    (Results Pending)        Considerations/further MDM:  .  I have accessed for and considered: Fracture, dislocation, compartment syndrome, DVT, arterial involvement, arthritis, tendon or neurovascular compromise or dysfunction, cellulitis, dislocation.          Procedure  Orthopaedic Injury Treatment - Lower Extremity    Performed by: Vu Dominique PA-C  Authorized by: Benitez Ng MD    Consent:     Consent obtained:  Verbal and written    Consent given by:  Patient and spouse    Risks, benefits, and alternatives were discussed: yes      Risks discussed:  Fracture and restricted joint movement  Location:     Location:  Ankle    Ankle injury location:  R ankle  Sedation:     Sedation type:  Moderate sedation  Procedure details:     Manipulation performed: yes      Ankle reduction method:  Traction and counter traction    Reduction successful: yes      Reduction confirmed with imaging: yes      Immobilization:  Splint  Post-procedure details:     Procedure completion:  Tolerated  Comments:      Patient tolerated well with no complication, the patient had successful reduction confirmed by secondary x-ray.  With traction countertraction and some injury exaggeration followed by anatomical realignment there is much better anatomical positioning and then immobilization by Ortho-Glass.  Distal sensation motor function and pulses remained intact post procedure.    Posterior and sugar-tong Ortho-Glass splint placed with good anatomical alignment and neurovascular status.      ED Supervising Attending Note & Attestation   I was the Supervising Physician. I have seen face to face and examined the patient; reviewed history and physical, performed  a substantive portion of the medical decision making, and discussed the medical decision making with the Medical Student, Resident, Fellow, PA and/or NP. I agree with the assessment and plan as presented unless otherwise documented as follows:     73-year-old male with no significant past medical history besides hypertension presenting to the emergency room after ankle injury.  Patient was chopping wood and a piece of wood hit his right ankle.  Patient has a deformed right ankle at this time and notes that this occurred earlier today.  Patient denies any head strike or loss of consciousness and otherwise complains of no nausea, vomiting, chest pain, shortness of breath.    Vital signs reviewed: Afebrile, normotensive, 79 bpm, 98% on room air    Exam     Constitutional: No acute distress. Resting comfortably.   Head: Normocephalic, atraumatic.   Eyes: Pupils equal bilaterally, EOM grossly intact, conjunctiva normal.  Mouth/Throat: Oropharynx is clear, moist mucus membranes.   Neck: Supple. No lymphadenopathy.  Cardiovascular: Regular rate and regular rhythm. Extremities are well-perfused.   Pulmonary/Chest: No respiratory distress, breathing comfortably on room air.    Abdominal: Soft, non-tender, non-distended. No rebound or guarding.   Musculoskeletal: No lower extremity edema.  Obvious deformity to right ankle with foot angled laterally, intact distal pulses with intact sensation and motor function.     Skin: Warm, dry, and intact.   Neurological: Patient is oriented to person, place, time, and situation. Face symmetric, hearing intact to voice, speech normal. Moves all extremities.     Differential includes but is not limited to:  Ankle fracture versus ankle dislocation versus open fracture    Amount and/or Complexity of Data Reviewed  External Data Reviewed: notes.      Labs: ordered.  Labs Reviewed   CBC WITH AUTO DIFFERENTIAL - Abnormal       Result Value    WBC 11.9 (*)     nRBC 0.0      RBC 3.97 (*)      Hemoglobin 13.0 (*)     Hematocrit 37.9 (*)     MCV 96      MCH 32.7      MCHC 34.3      RDW 11.9      Platelets 315      Neutrophils % 88.8      Immature Granulocytes %, Automated 0.4      Lymphocytes % 4.7      Monocytes % 5.7      Eosinophils % 0.1      Basophils % 0.3      Neutrophils Absolute 10.56 (*)     Immature Granulocytes Absolute, Automated 0.05      Lymphocytes Absolute 0.56 (*)     Monocytes Absolute 0.68      Eosinophils Absolute 0.01      Basophils Absolute 0.03     COMPREHENSIVE METABOLIC PANEL       Radiology: ordered and independent interpretation performed.  X-ray of ankle as interpreted by me shows fibular neck fracture as well as tibial dislocation.    Patient requiring procedural sedation at this time for relocation of ankle.  Patient tolerated procedural sedation well at this time and is returning to baseline.  Ankle was successfully relocated per post reduction x-ray.    Lab work at this time with no significant CBC abnormality nor comprehensive metabolic panel abnormality such as electrolyte disarray.  Patient given Ancef and otherwise imaging per radiology is consistent with my prior reads.    Discussion of management or test interpretation with external provider(s):  Patient presentation otherwise discussed with admitting physician and will admit at this time for trimalleolar fracture.  PATIENT REFERRED TO:  No follow-up provider specified.        DISCHARGE MEDICATIONS:  New Prescriptions    No medications on file       Benitez Ng MD  2:08 PM    Attending Emergency Physician  [facility] EMERGENCY MEDICINE             Vu Dominique PA-C  12/17/23 0744       Benitez Ng MD  12/29/23 7714

## 2023-11-14 NOTE — ED PROCEDURE NOTE
Procedure  Moderate Sedation    Performed by: Benitez Ng MD  Authorized by: Benitez Ng MD    Consent:     Consent obtained:  Verbal    Consent given by:  Patient    Risks, benefits, and alternatives were discussed: yes      Risks discussed:  Allergic reaction, prolonged hypoxia resulting in organ damage, prolonged sedation necessitating reversal and respiratory compromise necessitating ventilatory assistance and intubation    Alternatives discussed:  Analgesia without sedation  Universal protocol:     Patient identity confirmed:  Verbally with patient, hospital-assigned identification number and arm band  Indications:     Procedure performed:  Fracture reduction    Procedure necessitating sedation performed by:  Physician performing sedation    Intended level of sedation:  Moderate  Pre-sedation assessment:     ASA classification: class 2 - patient with mild systemic disease      Mouth opening:  3 or more finger widths    Thyromental distance:  3 finger widths    Mallampati score:  II - soft palate, uvula, fauces visible    Neck mobility: normal      Pre-sedation assessments completed and reviewed: airway patency, cardiovascular function, mental status, nausea/vomiting, pain level and respiratory function      History of difficult intubation: no    Immediate pre-procedure details:     Reassessment: Patient reassessed immediately prior to procedure      Reviewed: vital signs and relevant labs/tests      Verified: bag valve mask available, intubation equipment available, IV patency confirmed, oxygen available and suction available    Procedure details (see MAR for exact dosages):     Preoxygenation:  Nasal cannula    Sedation:  Propofol    Analgesia:  Morphine    Intra-procedure monitoring:  Blood pressure monitoring, continuous capnometry, continuous pulse oximetry and cardiac monitor    Intra-procedure events: none    Post-procedure details:     Attendance: Constant attendance by certified staff until patient  recovered      Recovery: Patient returned to pre-procedure baseline      Post-sedation assessments completed and reviewed: airway patency, mental status and respiratory function      Specimens recovered:  None    Patient is stable for discharge or admission: yes      Procedure completion:  Tolerated               Benitez Ng MD  11/14/23 7161

## 2023-11-14 NOTE — H&P
History Of Present Illness  Chidi Campos is a 73 y.o. male presenting with ankle pain. Patient has history of hypertension and hypothyroidism. He presents today after cutting and moving lumbar. A piece fell and landed on R foot/ankle. Patient denies other injury, did not hit head. His friend called 911. He had obvious deformity of right ankle on arrival. Reduced in ER by provider.    Currently patient rates pain as 7/10. Says it's better than it was before.    Reports he has been in his usual state of health. He is healthy and active. 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 Reports mild sore throat on Monday, better now.     Past Medical History  He has a past medical history of Hypertension and Hypothyroidism.    Surgical History  He has a past surgical history that includes Cholecystectomy and Hernia repair.     Social History  He reports that he has never smoked. He has never used smokeless tobacco. He reports that he does not currently use alcohol. No history on file for drug use.    Family History  Family History   Problem Relation Name Age of Onset    Hypertension Mother      Hyperlipidemia Mother      Lung cancer Father          Allergies  Patient has no known allergies.    Review of Systems   General: no fatigue, no malaise, no fevers/chills   HENT: no rhinorrhea, + sore throat, no ear pain   Eyes: no change in vision, denies eye pain or discharge   Lungs: no SOB, no cough, no hemoptysis   CV: no chest pain, no palpitations, no leg edema   Abd: no nausea/vomiting, no constipation/diarrhea, no abdominal pain   : no dysuria, no frequency, no nocturia, no flank pain   Endocrine: no polydipsia/polyuria, no hot or cold intolerance   Neuro: no headaches, no syncope, no seizures   MSK: no back pain, no neck pain, no joint problems. See HPI   Psych: no anxiety, no depression, no hallucinations    Physical Exam  General: alert, no  diaphoresis   HENT: mucous membranes moist, external ears normal, no rhinorrhea   Eyes: no icterus or injection, no discharge   Lungs: CTA BL   Heart: RRR, no LE edema BL   GI: abdomen soft, nontender, nondistended, BS present   MSK: no joint effusion or deformity   Skin: no rashes, erythema, or ecchymosis   Neuro: grossly normal cognition, motor strength, sensation       Last Recorded Vitals  /63 (BP Location: Right arm, Patient Position: Lying)   Pulse 103   Temp 36.7 °C (98.1 °F) (Temporal)   Resp 18   Wt 74.8 kg (165 lb)   SpO2 99%     Relevant Results      Results for orders placed or performed during the hospital encounter of 11/14/23 (from the past 24 hour(s))   CBC and Auto Differential   Result Value Ref Range    WBC 11.9 (H) 4.4 - 11.3 x10*3/uL    nRBC 0.0 0.0 - 0.0 /100 WBCs    RBC 3.97 (L) 4.50 - 5.90 x10*6/uL    Hemoglobin 13.0 (L) 13.5 - 17.5 g/dL    Hematocrit 37.9 (L) 41.0 - 52.0 %    MCV 96 80 - 100 fL    MCH 32.7 26.0 - 34.0 pg    MCHC 34.3 32.0 - 36.0 g/dL    RDW 11.9 11.5 - 14.5 %    Platelets 315 150 - 450 x10*3/uL    Neutrophils % 88.8 40.0 - 80.0 %    Immature Granulocytes %, Automated 0.4 0.0 - 0.9 %    Lymphocytes % 4.7 13.0 - 44.0 %    Monocytes % 5.7 2.0 - 10.0 %    Eosinophils % 0.1 0.0 - 6.0 %    Basophils % 0.3 0.0 - 2.0 %    Neutrophils Absolute 10.56 (H) 1.60 - 5.50 x10*3/uL    Immature Granulocytes Absolute, Automated 0.05 0.00 - 0.50 x10*3/uL    Lymphocytes Absolute 0.56 (L) 0.80 - 3.00 x10*3/uL    Monocytes Absolute 0.68 0.05 - 0.80 x10*3/uL    Eosinophils Absolute 0.01 0.00 - 0.40 x10*3/uL    Basophils Absolute 0.03 0.00 - 0.10 x10*3/uL   Comprehensive Metabolic Panel   Result Value Ref Range    Glucose 106 (H) 65 - 99 mg/dL    Sodium 136 133 - 145 mmol/L    Potassium 3.8 3.4 - 5.1 mmol/L    Chloride 101 97 - 107 mmol/L    Bicarbonate 24 24 - 31 mmol/L    Urea Nitrogen 14 8 - 25 mg/dL    Creatinine 1.10 0.40 - 1.60 mg/dL    eGFR 71 >60 mL/min/1.73m*2    Calcium 8.7 8.5  - 10.4 mg/dL    Albumin 3.9 3.5 - 5.0 g/dL    Alkaline Phosphatase 59 35 - 125 U/L    Total Protein 6.5 5.9 - 7.9 g/dL    AST 19 5 - 40 U/L    Bilirubin, Total 1.5 (H) 0.1 - 1.2 mg/dL    ALT 15 5 - 40 U/L    Anion Gap 11 <=19 mmol/L     XR ankle right 3+ views    Result Date: 11/14/2023  Interpreted By:  Thomas Bagley, STUDY: XR ANKLE RIGHT 3+ VIEWS; 11/14/2023 2:02 pm   INDICATION: Signs/Symptoms:post reduction;   COMPARISON: Earlier on 11/14/2023   ACCESSION NUMBER(S): [id number]   ORDERING CLINICIAN: PRICILLA AGUIRRE   TECHNIQUE: Views: AP, Lateral, Oblique   FINDINGS: Interval reduction of right ankle dislocation. The tibiotalar joint appears intact. Mild widening of the medial clear space. There is a posterior malleolar fracture which is not well-visualized on the given views. The medial malleolus appears intact. Overlying cast.       Interval reduction of right ankle dislocation which is now located. There is mild widening of the medial clear space. Mildly displaced fracture through the mid-distal diaphysis of the right fibula, in markedly improved anatomic position. Posterior malleolar fracture of the tibia not well-visualized on the given view.   Signed by: Thomas Bagley 11/14/2023 3:04 PM Dictation workstation:   [id number]    XR ankle right 3+ views    Result Date: 11/14/2023  Interpreted By:  Thomas Bagley, STUDY: XR ANKLE RIGHT 3+ VIEWS; 11/14/2023 12:42 pm   INDICATION: Signs/Symptoms:injury;   COMPARISON: None available.   ACCESSION NUMBER(S): [id number]   ORDERING CLINICIAN: BARBARA GRUBER   TECHNIQUE: Views: AP, Lateral, Oblique   FINDINGS: Complete dislocation of right ankle tibiotalar joint. There is displaced and angulated fracture through the distal diaphysis of the right fibula. The medial malleolus is not well visualized on the given views neither is the posterior malleolus of the tibia although a fracture fragment is seen on the AP view concerning for a trimalleolar fracture.  Cross-sectional imaging can be obtained as deemed clinically warranted.       Complete dislocation of the right ankle, tibiotalar joint. Displaced and angulated fracture through the distal diaphysis of the right fibula. Probable trimalleolar fracture although limited on plain film evaluation, consider cross-sectional imaging as deemed clinically warranted.   Signed by: Thomas Bagley 11/14/2023 1:43 PM Dictation workstation:   [id number]        Assessment/Plan   Principal Problem:    Displaced comminuted fracture of shaft of right fibula, initial encounter for closed fracture    R ankle fracture  - podiatry consulted- plan for OR tomorrow  - NPO at midnight  - pain control  - nausea control    hTN  - BP low at this point. Hold home medications    Hypothyroidism  - continue synthroid           Beulah Ballesteros DO

## 2023-11-14 NOTE — ED NOTES
Pt arrived from home with complaint of right ankle pain. Pt states he was cutting wood and a piece fell on his right foot. Pulses intact, pt arrived with air splint placed by EMS. Pt received 100mcg of fentanyl by EMS. Pt denies falling or hitting head. Pt alert and oriented x3. Pt arrived with 18g IV to left AC.      Delia Huff RN  11/14/23 [id number]

## 2023-11-14 NOTE — CONSULTS
Consults    Reason For Consult  Right ankle fracture    History Of Present Illness  Chidi Campos is a 73 y.o. male presenting with right ankle fracture.  Patient seen for evaluation of right ankle fracture.  Injury occurred today.  Patient states he was cutting logs, a log rolled up onto his right foot and ankle.  Had immediate onset pain and obvious deformity to the ankle.  He was taken to the [facility] emergency department where he was evaluated.  Radiographs showed a displaced trimalleolar fracture of the right ankle.  While in the emergency department he was started on IV antibiotics for concern of a small wound, no exposed bone.  His ankle was then reduced and splinted.  He was then admitted for further evaluation.  Currently patient points to the right ankle.  Rates pain is 8 out of 10.  He states he is doing very well overall.  Patient with PMH significant for HTN.  No other complaints.     Past Medical History  He has a past medical history of Hypertension and Hypothyroidism.    Surgical History  He has a past surgical history that includes Cholecystectomy and Hernia repair.     Social History  He reports that he has never smoked. He has never used smokeless tobacco. He reports that he does not currently use alcohol. No history on file for drug use.    Family History  Family History   Problem Relation Name Age of Onset    Hypertension Mother      Hyperlipidemia Mother      Lung cancer Father          Allergies  Patient has no known allergies.    Review of Systems    REVIEW OF SYSTEMS  GENERAL:  Negative for malaise, significant weight loss, fever  HEENT:  No changes in hearing or vision, no nose bleeds or other nasal problems and Negative for frequent or significant headaches  NECK:  Negative for lumps, goiter, pain and significant neck swelling  RESPIRATORY:  Negative for cough, wheezing and shortness of breath  CARDIOVASCULAR:  Negative for chest pain, leg swelling and palpitations  GI:  Negative for  "abdominal discomfort, blood in stools or black stools and change in bowel habits  :  Negative for dysuria, frequency and incontinence  MUSCULOSKELETAL:  see hpi  SKIN:  Negative for lesions, rash, and itching  PSYCH:  Negative for sleep disturbance, mood disorder and recent psychosocial stressors  HEMATOLOGY/LYMPHOLOGY:  Negative for prolonged bleeding, bruising easily, and swollen nodes.  ENDOCRINE:  Negative for cold or heat intolerance, polyuria, polydipsia and goiter  NEURO: Negative, denies any burning, tingling or numbness        Physical Exam    Patient Aox3, nad  Objective:   RLE exam:  Splint dressing in place  Toes warm, pink.  CFT immediate  Digital sensation intact  Satisfactory alignment of right ankle and foot, no obvious deformity.  No pain to proximal fibula.  Calf supple.       Last Recorded Vitals  Blood pressure 129/63, pulse 103, temperature 36.7 °C (98.1 °F), temperature source Temporal, resp. rate 18, height 1.7 m (5' 6.93\"), weight 74 kg (163 lb 2.3 oz), SpO2 99 %.    Relevant Results  IMPRESSION:  Complete dislocation of the right ankle, tibiotalar joint. Displaced  and angulated fracture through the distal diaphysis of the right  fibula. Probable trimalleolar fracture although limited on plain film  evaluation, consider cross-sectional imaging as deemed clinically  warranted.    Post reduction:  IMPRESSION:  Interval reduction of right ankle dislocation which is now located.  There is mild widening of the medial clear space. Mildly displaced  fracture through the mid-distal diaphysis of the right fibula, in  markedly improved anatomic position. Posterior malleolar fracture of  the tibia not well-visualized on the given view.    CT scan:  IMPRESSION:  1. Minimally incongruent ankle mortise with mild widening of the  medial clear space measuring 6 mm. There is a displaced fracture  through the posterior malleolus of the tibia with osteochondral  fragment measuring 0.9 x 2.6 x 2.7 cm. There " is posterior  displacement of the fragment measuring 10 mm at the level of the  ankle joint with several interposed ossific fragments between the  osteochondral fragment in the donor site.      2. Minimally comminuted fracture through the distal fibular diaphysis  with minimal lateral displacement of 3 mm.     Assessment/Plan     Right Trimalleolar ankle fracture    I spent greater than 60 minutes in the professional and overall care of this patient.  Initial evaluation and consultation.  Patient sustained a right trimalleolar ankle fracture today, was seen in the emergency department where he was reduced and splinted.  Postreduction films show satisfactory alignment and reduction.  CT scan also confirms comminuted fracture of the distal fibula with displaced fracture of the posterior malleolus and ankle joint widening.  I had a long discussion with patient and family regarding treatment plans.  Given the nature of his fracture recommend surgical intervention including open reduction internal fixation of right ankle fracture.  Discussed risks, benefits, nature, alternatives, potential complications, and postoperative management.  Risks and potential complications including, but not limited to; infection, wound healing complications, bone healing complications including nonunion, delayed union, malunion, arthrosis, instability, gait disturbances, the need for additional surgery including a staged procedure, DVT, PE.  All patient's questions were answered to his apparent satisfaction.  No guarantees were given as to the outcome of the current treatment plan.  Patient fully understands and elects to proceed.  For now continue strict nonweightbearing to right foot.  Ice, elevation.  Pain management.  N.p.o. after midnight tonight.  Surgery tentatively scheduled for November 15.  Discussed with RN.  He is in full agreement with the current plan.  Will follow.    Segun Bae DPM  11/14/23  5:12 PM

## 2023-11-14 NOTE — NURSING NOTE
Arrived to [facility] from ED.  1 person assist into bed.  Patient awake and alert.  Splint to R foot.  Circulation checks completed.  Tip of toe is cold.  Able to move toes with no pain, pedal pulses palpable.  No edema observed.  Cap refill less than 3 seconds.  Wife at bedside.  Removed jeans and placed PJ pants on patient.  Given urinal to void.  Oriented to room and introduced to staff.  Educated on NWB status to R leg and encouraged to rest.  Call light and commonly used items in reach.  Bed locked and in low position.

## 2023-11-15 ENCOUNTER — APPOINTMENT (OUTPATIENT)
Dept: RADIOLOGY | Facility: HOSPITAL | Age: 73
DRG: 494 | End: 2023-11-15
Payer: MEDICARE

## 2023-11-15 ENCOUNTER — ANESTHESIA (OUTPATIENT)
Dept: OPERATING ROOM | Facility: HOSPITAL | Age: 73
DRG: 494 | End: 2023-11-15
Payer: MEDICARE

## 2023-11-15 ENCOUNTER — APPOINTMENT (OUTPATIENT)
Dept: RADIOLOGY | Facility: HOSPITAL | Age: 73
End: 2023-11-15
Payer: MEDICARE

## 2023-11-15 ENCOUNTER — ANESTHESIA EVENT (OUTPATIENT)
Dept: OPERATING ROOM | Facility: HOSPITAL | Age: 73
DRG: 494 | End: 2023-11-15
Payer: MEDICARE

## 2023-11-15 LAB
ANION GAP SERPL CALC-SCNC: 12 MMOL/L
BUN SERPL-MCNC: 10 MG/DL (ref 8–25)
CALCIUM SERPL-MCNC: 8.9 MG/DL (ref 8.5–10.4)
CHLORIDE SERPL-SCNC: 101 MMOL/L (ref 97–107)
CO2 SERPL-SCNC: 24 MMOL/L (ref 24–31)
CREAT SERPL-MCNC: 1.1 MG/DL (ref 0.4–1.6)
ERYTHROCYTE [DISTWIDTH] IN BLOOD BY AUTOMATED COUNT: 11.9 % (ref 11.5–14.5)
GFR SERPL CREATININE-BSD FRML MDRD: 71 ML/MIN/1.73M*2
GLUCOSE SERPL-MCNC: 115 MG/DL (ref 65–99)
HCT VFR BLD AUTO: 35.9 % (ref 41–52)
HGB BLD-MCNC: 12.4 G/DL (ref 13.5–17.5)
MCH RBC QN AUTO: 32.7 PG (ref 26–34)
MCHC RBC AUTO-ENTMCNC: 34.5 G/DL (ref 32–36)
MCV RBC AUTO: 95 FL (ref 80–100)
NRBC BLD-RTO: 0 /100 WBCS (ref 0–0)
PLATELET # BLD AUTO: 288 X10*3/UL (ref 150–450)
POTASSIUM SERPL-SCNC: 3.5 MMOL/L (ref 3.4–5.1)
RBC # BLD AUTO: 3.79 X10*6/UL (ref 4.5–5.9)
SODIUM SERPL-SCNC: 137 MMOL/L (ref 133–145)
WBC # BLD AUTO: 7.1 X10*3/UL (ref 4.4–11.3)

## 2023-11-15 PROCEDURE — 2500000004 HC RX 250 GENERAL PHARMACY W/ HCPCS (ALT 636 FOR OP/ED): Performed by: ANESTHESIOLOGIST ASSISTANT

## 2023-11-15 PROCEDURE — 36415 COLL VENOUS BLD VENIPUNCTURE: CPT | Performed by: HOSPITALIST

## 2023-11-15 PROCEDURE — 2500000001 HC RX 250 WO HCPCS SELF ADMINISTERED DRUGS (ALT 637 FOR MEDICARE OP): Performed by: HOSPITALIST

## 2023-11-15 PROCEDURE — 76000 FLUOROSCOPY <1 HR PHYS/QHP: CPT

## 2023-11-15 PROCEDURE — 2720000007 HC OR 272 NO HCPCS: Performed by: PODIATRIST

## 2023-11-15 PROCEDURE — 2500000004 HC RX 250 GENERAL PHARMACY W/ HCPCS (ALT 636 FOR OP/ED): Performed by: PODIATRIST

## 2023-11-15 PROCEDURE — A27828 PR OPEN TREATMENT FRACTURE DISTAL TIBIA AND FIBULA: Performed by: STUDENT IN AN ORGANIZED HEALTH CARE EDUCATION/TRAINING PROGRAM

## 2023-11-15 PROCEDURE — A27828 PR OPEN TREATMENT FRACTURE DISTAL TIBIA AND FIBULA: Performed by: ANESTHESIOLOGIST ASSISTANT

## 2023-11-15 PROCEDURE — 3700000002 HC GENERAL ANESTHESIA TIME - EACH INCREMENTAL 1 MINUTE: Performed by: PODIATRIST

## 2023-11-15 PROCEDURE — 2500000004 HC RX 250 GENERAL PHARMACY W/ HCPCS (ALT 636 FOR OP/ED): Performed by: HOSPITALIST

## 2023-11-15 PROCEDURE — 3700000001 HC GENERAL ANESTHESIA TIME - INITIAL BASE CHARGE: Performed by: PODIATRIST

## 2023-11-15 PROCEDURE — 2500000005 HC RX 250 GENERAL PHARMACY W/O HCPCS: Performed by: ANESTHESIOLOGIST ASSISTANT

## 2023-11-15 PROCEDURE — 99100 ANES PT EXTEME AGE<1 YR&>70: CPT | Performed by: STUDENT IN AN ORGANIZED HEALTH CARE EDUCATION/TRAINING PROGRAM

## 2023-11-15 PROCEDURE — 2500000004 HC RX 250 GENERAL PHARMACY W/ HCPCS (ALT 636 FOR OP/ED): Performed by: STUDENT IN AN ORGANIZED HEALTH CARE EDUCATION/TRAINING PROGRAM

## 2023-11-15 PROCEDURE — 85027 COMPLETE CBC AUTOMATED: CPT | Performed by: HOSPITALIST

## 2023-11-15 PROCEDURE — 2780000003 HC OR 278 NO HCPCS: Performed by: PODIATRIST

## 2023-11-15 PROCEDURE — 0QSJ04Z REPOSITION RIGHT FIBULA WITH INTERNAL FIXATION DEVICE, OPEN APPROACH: ICD-10-PCS | Performed by: PODIATRIST

## 2023-11-15 PROCEDURE — 7100000002 HC RECOVERY ROOM TIME - EACH INCREMENTAL 1 MINUTE: Performed by: PODIATRIST

## 2023-11-15 PROCEDURE — 64445 NJX AA&/STRD SCIATIC NRV IMG: CPT | Performed by: STUDENT IN AN ORGANIZED HEALTH CARE EDUCATION/TRAINING PROGRAM

## 2023-11-15 PROCEDURE — 1100000001 HC PRIVATE ROOM DAILY

## 2023-11-15 PROCEDURE — C1713 ANCHOR/SCREW BN/BN,TIS/BN: HCPCS | Performed by: PODIATRIST

## 2023-11-15 PROCEDURE — 3600000004 HC OR TIME - INITIAL BASE CHARGE - PROCEDURE LEVEL FOUR: Performed by: PODIATRIST

## 2023-11-15 PROCEDURE — 7100000001 HC RECOVERY ROOM TIME - INITIAL BASE CHARGE: Performed by: PODIATRIST

## 2023-11-15 PROCEDURE — 3600000009 HC OR TIME - EACH INCREMENTAL 1 MINUTE - PROCEDURE LEVEL FOUR: Performed by: PODIATRIST

## 2023-11-15 PROCEDURE — 2500000005 HC RX 250 GENERAL PHARMACY W/O HCPCS: Performed by: PODIATRIST

## 2023-11-15 PROCEDURE — 80048 BASIC METABOLIC PNL TOTAL CA: CPT | Performed by: HOSPITALIST

## 2023-11-15 DEVICE — IMPLANTABLE DEVICE
Type: IMPLANTABLE DEVICE | Site: ANKLE | Status: FUNCTIONAL
Brand: ORTHOLOC

## 2023-11-15 DEVICE — IMPLANTABLE DEVICE: Type: IMPLANTABLE DEVICE | Site: ANKLE | Status: NON-FUNCTIONAL

## 2023-11-15 DEVICE — PLATE TACK, CLAW II: Type: IMPLANTABLE DEVICE | Site: ANKLE | Status: NON-FUNCTIONAL

## 2023-11-15 RX ORDER — ONDANSETRON HYDROCHLORIDE 2 MG/ML
4 INJECTION, SOLUTION INTRAVENOUS ONCE AS NEEDED
Status: DISCONTINUED | OUTPATIENT
Start: 2023-11-15 | End: 2023-11-16 | Stop reason: HOSPADM

## 2023-11-15 RX ORDER — SODIUM CHLORIDE, SODIUM LACTATE, POTASSIUM CHLORIDE, CALCIUM CHLORIDE 600; 310; 30; 20 MG/100ML; MG/100ML; MG/100ML; MG/100ML
40 INJECTION, SOLUTION INTRAVENOUS CONTINUOUS
Status: DISCONTINUED | OUTPATIENT
Start: 2023-11-15 | End: 2023-11-15 | Stop reason: HOSPADM

## 2023-11-15 RX ORDER — HEPARIN SODIUM 5000 [USP'U]/ML
5000 INJECTION, SOLUTION INTRAVENOUS; SUBCUTANEOUS DAILY
Status: DISCONTINUED | OUTPATIENT
Start: 2023-11-16 | End: 2023-11-16 | Stop reason: HOSPADM

## 2023-11-15 RX ORDER — FENTANYL CITRATE 50 UG/ML
50 INJECTION, SOLUTION INTRAMUSCULAR; INTRAVENOUS EVERY 5 MIN PRN
Status: DISCONTINUED | OUTPATIENT
Start: 2023-11-15 | End: 2023-11-16 | Stop reason: HOSPADM

## 2023-11-15 RX ORDER — VANCOMYCIN HYDROCHLORIDE 1 G/20ML
INJECTION, POWDER, LYOPHILIZED, FOR SOLUTION INTRAVENOUS AS NEEDED
Status: DISCONTINUED | OUTPATIENT
Start: 2023-11-15 | End: 2023-11-15 | Stop reason: HOSPADM

## 2023-11-15 RX ORDER — PROPOFOL 10 MG/ML
INJECTION, EMULSION INTRAVENOUS AS NEEDED
Status: DISCONTINUED | OUTPATIENT
Start: 2023-11-15 | End: 2023-11-15

## 2023-11-15 RX ORDER — GLYCOPYRROLATE 0.2 MG/ML
INJECTION INTRAMUSCULAR; INTRAVENOUS AS NEEDED
Status: DISCONTINUED | OUTPATIENT
Start: 2023-11-15 | End: 2023-11-15

## 2023-11-15 RX ORDER — ROCURONIUM BROMIDE 10 MG/ML
INJECTION, SOLUTION INTRAVENOUS AS NEEDED
Status: DISCONTINUED | OUTPATIENT
Start: 2023-11-15 | End: 2023-11-15

## 2023-11-15 RX ORDER — CEFAZOLIN SODIUM 2 G/100ML
2 INJECTION, SOLUTION INTRAVENOUS ONCE
Status: DISCONTINUED | OUTPATIENT
Start: 2023-11-15 | End: 2023-11-15

## 2023-11-15 RX ORDER — ALBUTEROL SULFATE 0.83 MG/ML
2.5 SOLUTION RESPIRATORY (INHALATION) EVERY 30 MIN PRN
Status: DISCONTINUED | OUTPATIENT
Start: 2023-11-15 | End: 2023-11-16 | Stop reason: HOSPADM

## 2023-11-15 RX ORDER — FENTANYL CITRATE 50 UG/ML
50 INJECTION, SOLUTION INTRAMUSCULAR; INTRAVENOUS ONCE
Status: COMPLETED | OUTPATIENT
Start: 2023-11-15 | End: 2023-11-15

## 2023-11-15 RX ORDER — MIDAZOLAM HYDROCHLORIDE 1 MG/ML
2 INJECTION, SOLUTION INTRAMUSCULAR; INTRAVENOUS ONCE
Status: COMPLETED | OUTPATIENT
Start: 2023-11-15 | End: 2023-11-15

## 2023-11-15 RX ORDER — PHENYLEPHRINE HCL IN 0.9% NACL 0.4MG/10ML
SYRINGE (ML) INTRAVENOUS AS NEEDED
Status: DISCONTINUED | OUTPATIENT
Start: 2023-11-15 | End: 2023-11-15

## 2023-11-15 RX ORDER — LABETALOL HYDROCHLORIDE 5 MG/ML
5 INJECTION, SOLUTION INTRAVENOUS EVERY 5 MIN PRN
Status: DISCONTINUED | OUTPATIENT
Start: 2023-11-15 | End: 2023-11-16 | Stop reason: HOSPADM

## 2023-11-15 RX ORDER — IPRATROPIUM BROMIDE 0.5 MG/2.5ML
500 SOLUTION RESPIRATORY (INHALATION) EVERY 30 MIN PRN
Status: DISCONTINUED | OUTPATIENT
Start: 2023-11-15 | End: 2023-11-16 | Stop reason: HOSPADM

## 2023-11-15 RX ORDER — CEFAZOLIN SODIUM 2 G/50ML
2 SOLUTION INTRAVENOUS ONCE
Status: DISCONTINUED | OUTPATIENT
Start: 2023-11-15 | End: 2023-11-15

## 2023-11-15 RX ORDER — DEXAMETHASONE SODIUM PHOSPHATE 4 MG/ML
INJECTION, SOLUTION INTRA-ARTICULAR; INTRALESIONAL; INTRAMUSCULAR; INTRAVENOUS; SOFT TISSUE AS NEEDED
Status: DISCONTINUED | OUTPATIENT
Start: 2023-11-15 | End: 2023-11-15

## 2023-11-15 RX ORDER — CEFAZOLIN 1 G/1
INJECTION, POWDER, FOR SOLUTION INTRAVENOUS AS NEEDED
Status: DISCONTINUED | OUTPATIENT
Start: 2023-11-15 | End: 2023-11-15

## 2023-11-15 RX ORDER — NEOSTIGMINE METHYLSULFATE 1 MG/ML
INJECTION, SOLUTION INTRAVENOUS AS NEEDED
Status: DISCONTINUED | OUTPATIENT
Start: 2023-11-15 | End: 2023-11-15

## 2023-11-15 RX ORDER — LIDOCAINE HYDROCHLORIDE 20 MG/ML
INJECTION, SOLUTION INFILTRATION; PERINEURAL AS NEEDED
Status: DISCONTINUED | OUTPATIENT
Start: 2023-11-15 | End: 2023-11-15

## 2023-11-15 RX ADMIN — FENTANYL CITRATE 25 MCG: 0.05 INJECTION, SOLUTION INTRAMUSCULAR; INTRAVENOUS at 13:07

## 2023-11-15 RX ADMIN — FENTANYL CITRATE 50 MCG: 50 INJECTION INTRAMUSCULAR; INTRAVENOUS at 10:11

## 2023-11-15 RX ADMIN — LIDOCAINE HYDROCHLORIDE 35 MG: 20 INJECTION, SOLUTION INFILTRATION; PERINEURAL at 10:52

## 2023-11-15 RX ADMIN — NEOSTIGMINE METHYLSULFATE 2 MG: 1 INJECTION, SOLUTION INTRAVENOUS at 13:42

## 2023-11-15 RX ADMIN — ROCURONIUM BROMIDE 50 MG: 10 INJECTION, SOLUTION INTRAVENOUS at 10:53

## 2023-11-15 RX ADMIN — Medication 150 MCG: at 10:54

## 2023-11-15 RX ADMIN — PROPOFOL 25 MG: 10 INJECTION, EMULSION INTRAVENOUS at 13:29

## 2023-11-15 RX ADMIN — FENTANYL CITRATE 25 MCG: 0.05 INJECTION, SOLUTION INTRAMUSCULAR; INTRAVENOUS at 14:08

## 2023-11-15 RX ADMIN — FENTANYL CITRATE 50 MCG: 50 INJECTION INTRAMUSCULAR; INTRAVENOUS at 14:28

## 2023-11-15 RX ADMIN — PROPOFOL 25 MG: 10 INJECTION, EMULSION INTRAVENOUS at 13:07

## 2023-11-15 RX ADMIN — Medication 100 MCG: at 11:07

## 2023-11-15 RX ADMIN — GLYCOPYRROLATE 0.4 MG: 0.2 INJECTION INTRAMUSCULAR; INTRAVENOUS at 13:42

## 2023-11-15 RX ADMIN — OXYCODONE HYDROCHLORIDE 5 MG: 5 TABLET ORAL at 02:29

## 2023-11-15 RX ADMIN — DEXAMETHASONE SODIUM PHOSPHATE 4 MG: 4 INJECTION, SOLUTION INTRA-ARTICULAR; INTRALESIONAL; INTRAMUSCULAR; INTRAVENOUS; SOFT TISSUE at 11:39

## 2023-11-15 RX ADMIN — FENTANYL CITRATE 25 MCG: 0.05 INJECTION, SOLUTION INTRAMUSCULAR; INTRAVENOUS at 13:44

## 2023-11-15 RX ADMIN — SODIUM CHLORIDE, POTASSIUM CHLORIDE, SODIUM LACTATE AND CALCIUM CHLORIDE: 600; 310; 30; 20 INJECTION, SOLUTION INTRAVENOUS at 10:48

## 2023-11-15 RX ADMIN — Medication: at 09:15

## 2023-11-15 RX ADMIN — FENTANYL CITRATE 25 MCG: 0.05 INJECTION, SOLUTION INTRAMUSCULAR; INTRAVENOUS at 10:52

## 2023-11-15 RX ADMIN — ONDANSETRON HYDROCHLORIDE 4 MG: 2 INJECTION INTRAMUSCULAR; INTRAVENOUS at 11:39

## 2023-11-15 RX ADMIN — PROPOFOL 150 MG: 10 INJECTION, EMULSION INTRAVENOUS at 10:52

## 2023-11-15 RX ADMIN — NEOSTIGMINE METHYLSULFATE 1 MG: 1 INJECTION, SOLUTION INTRAVENOUS at 13:43

## 2023-11-15 RX ADMIN — MIDAZOLAM HYDROCHLORIDE 2 MG: 1 INJECTION, SOLUTION INTRAMUSCULAR; INTRAVENOUS at 10:13

## 2023-11-15 RX ADMIN — Medication 100 MCG: at 12:12

## 2023-11-15 RX ADMIN — GLYCOPYRROLATE 0.2 MG: 0.2 INJECTION INTRAMUSCULAR; INTRAVENOUS at 13:43

## 2023-11-15 RX ADMIN — SODIUM CHLORIDE, POTASSIUM CHLORIDE, SODIUM LACTATE AND CALCIUM CHLORIDE: 600; 310; 30; 20 INJECTION, SOLUTION INTRAVENOUS at 13:34

## 2023-11-15 RX ADMIN — ACETAMINOPHEN 650 MG: 325 TABLET ORAL at 02:33

## 2023-11-15 RX ADMIN — CEFAZOLIN 2 G: 330 INJECTION, POWDER, FOR SOLUTION INTRAMUSCULAR; INTRAVENOUS at 10:55

## 2023-11-15 RX ADMIN — FENTANYL CITRATE 50 MCG: 0.05 INJECTION, SOLUTION INTRAMUSCULAR; INTRAVENOUS at 13:06

## 2023-11-15 RX ADMIN — Medication 100 MCG: at 12:55

## 2023-11-15 RX ADMIN — OXYCODONE HYDROCHLORIDE 5 MG: 5 TABLET ORAL at 06:44

## 2023-11-15 SDOH — HEALTH STABILITY: MENTAL HEALTH: CURRENT SMOKER: 0

## 2023-11-15 ASSESSMENT — COGNITIVE AND FUNCTIONAL STATUS - GENERAL
MOBILITY SCORE: 24
MOBILITY SCORE: 21
DAILY ACTIVITIY SCORE: 22
WALKING IN HOSPITAL ROOM: A LITTLE
CLIMB 3 TO 5 STEPS WITH RAILING: A LOT
DAILY ACTIVITIY SCORE: 24
DRESSING REGULAR LOWER BODY CLOTHING: A LITTLE
TOILETING: A LITTLE

## 2023-11-15 ASSESSMENT — PAIN - FUNCTIONAL ASSESSMENT
PAIN_FUNCTIONAL_ASSESSMENT: 0-10
PAIN_FUNCTIONAL_ASSESSMENT: FLACC (FACE, LEGS, ACTIVITY, CRY, CONSOLABILITY)
PAIN_FUNCTIONAL_ASSESSMENT: WONG-BAKER FACES
PAIN_FUNCTIONAL_ASSESSMENT: FLACC (FACE, LEGS, ACTIVITY, CRY, CONSOLABILITY)
PAIN_FUNCTIONAL_ASSESSMENT: FLACC (FACE, LEGS, ACTIVITY, CRY, CONSOLABILITY)
PAIN_FUNCTIONAL_ASSESSMENT: 0-10
PAIN_FUNCTIONAL_ASSESSMENT: 0-10
PAIN_FUNCTIONAL_ASSESSMENT: FLACC (FACE, LEGS, ACTIVITY, CRY, CONSOLABILITY)
PAIN_FUNCTIONAL_ASSESSMENT: 0-10
PAIN_FUNCTIONAL_ASSESSMENT: 0-10

## 2023-11-15 ASSESSMENT — PAIN SCALES - GENERAL
PAINLEVEL_OUTOF10: 8
PAINLEVEL_OUTOF10: 0 - NO PAIN
PAINLEVEL_OUTOF10: 8
PAINLEVEL_OUTOF10: 0 - NO PAIN
PAINLEVEL_OUTOF10: 0 - NO PAIN
PAINLEVEL_OUTOF10: 4
PAINLEVEL_OUTOF10: 0 - NO PAIN
PAINLEVEL_OUTOF10: 0 - NO PAIN
PAINLEVEL_OUTOF10: 8

## 2023-11-15 NOTE — PROGRESS NOTES
Chidi Campos is a 73 y.o. male on day 1 of admission presenting with Displaced comminuted fracture of shaft of right fibula, initial encounter for closed fracture.      Subjective   Patient seen and examined. Awake/alert/oriented. Denies chest pain, shortness of breath, fevers, chills, nausea, or vomiting. No abdominal discomfort. Reports mild discomfort to surgical site.          Objective     Last Recorded Vitals  /77   Pulse 81   Temp 37.1 °C (98.8 °F) (Temporal)   Resp 19   Wt 76.2 kg (167 lb 15.9 oz)   SpO2 98%   Intake/Output last 3 Shifts:    Intake/Output Summary (Last 24 hours) at 11/15/2023 1532  Last data filed at 11/15/2023 1416  Gross per 24 hour   Intake 1644 ml   Output 2250 ml   Net -606 ml       Admission Weight  Weight: 74.8 kg (165 lb) (11/14/23 1148)    Daily Weight  11/15/23 : 76.2 kg (167 lb 15.9 oz)    Image Results  FL less than 1 hour  These images are not reportable by radiology and will not be interpreted   by  Radiologists.      Physical Exam  Vitals reviewed.   Constitutional:       Appearance: Normal appearance.   HENT:      Head: Normocephalic and atraumatic.   Eyes:      Extraocular Movements: Extraocular movements intact.      Conjunctiva/sclera: Conjunctivae normal.   Cardiovascular:      Rate and Rhythm: Normal rate and regular rhythm.   Pulmonary:      Effort: Pulmonary effort is normal.      Breath sounds: Normal breath sounds. No wheezing, rhonchi or rales.   Abdominal:      General: Bowel sounds are normal.      Palpations: Abdomen is soft.      Tenderness: There is no abdominal tenderness.   Musculoskeletal:      Comments: Limited ROM to RLE   Skin:     General: Skin is warm and dry.      Comments: Surgical dressing intact.    Neurological:      General: No focal deficit present.      Mental Status: He is alert and oriented to person, place, and time.         Relevant Results  Lab Results   Component Value Date    GLUCOSE 115 (H) 11/15/2023    CALCIUM 8.9  11/15/2023     11/15/2023    K 3.5 11/15/2023    CO2 24 11/15/2023     11/15/2023    BUN 10 11/15/2023    CREATININE 1.10 11/15/2023     Lab Results   Component Value Date    WBC 7.1 11/15/2023    HGB 12.4 (L) 11/15/2023    HCT 35.9 (L) 11/15/2023    MCV 95 11/15/2023     11/15/2023     FL less than 1 hour    Result Date: 11/15/2023  These images are not reportable by radiology and will not be interpreted by  Radiologists.    CT ankle right wo IV contrast    Result Date: 11/14/2023  Interpreted By:  Shannan Garcia, STUDY: CT ANKLE RIGHT WO IV CONTRAST; ;  11/14/2023 3:44 pm   INDICATION: Signs/Symptoms:pre op planning. Pain   COMPARISON: None.   ACCESSION NUMBER(S): [id number]   ORDERING CLINICIAN: PRICILLA AGUIRRE   TECHNIQUE: Serial axial CT images obtained of the right ankle. Images reformatted in the coronal and sagittal projection.   All CT examinations are performed with 1 or more of the following dose reduction techniques: Automated exposure control, adjustment of mA and/or kv according to patient's size, or use of iterative reconstruction techniques.   FINDINGS: Minimally displaced transverse fracture demonstrated through the distal fibular diaphysis with slight lateral oblique configuration of fracture and subtle comminution with a single fragment along the posterior margin of the fracture line. There is lateral displacement at the level of the fracture line measuring 3 mm. The fibular diaphysis extending distally is unremarkable. A few subtle cortical avulsion fragments are demonstrated from the anterior margin of the lateral malleolus at the attachment of the anterior talofibular ligament. Also, a few cortical fragments demonstrated about the distribution of the anterior tibiofibular ligament.   Medial malleolus demonstrates no significant fracture. A few cortical fragments are demonstrated about the distribution of the deep deltoid ligament with mild widening and lateral  subluxation of the talus. There is a osteochondral fracture through the posterior tibial plafond with interposed ossific fragments demonstrated. This ossific fragment measures 0.9 x 2.6 x 2.7 cm in the AP, transverse and craniocaudal dimension respectively. There are a few cortical fragments interposed between this fragment and the corresponding tibia with largest fragment measuring a proximally 9 mm. Subtle comminution at the fracture line with several bodies noted. There is posterior displacement of the osteochondral fragment at the level of the ankle joint line measuring 10 mm.   A few cortical fragments are demonstrated within the anterior ankle joint recess.   Talus demonstrates no evidence for fracture. Remainder of the hindfoot is unremarkable. Midfoot and intertarsal articulations are unremarkable. Visualized portions metatarsal shafts are unremarkable. There is moderate subcutaneous edema about the ankle and dorsum of the foot.           1. Minimally incongruent ankle mortise with mild widening of the medial clear space measuring 6 mm. There is a displaced fracture through the posterior malleolus of the tibia with osteochondral fragment measuring 0.9 x 2.6 x 2.7 cm. There is posterior displacement of the fragment measuring 10 mm at the level of the ankle joint with several interposed ossific fragments between the osteochondral fragment in the donor site.   2. Minimally comminuted fracture through the distal fibular diaphysis with minimal lateral displacement of 3 mm.     MACRO: None   Signed by: Shannan Garcia 11/14/2023 4:52 PM Dictation workstation:   [id number]    XR ankle right 3+ views    Result Date: 11/14/2023  Interpreted By:  Thomas Bagley, STUDY: XR ANKLE RIGHT 3+ VIEWS; 11/14/2023 2:02 pm   INDICATION: Signs/Symptoms:post reduction;   COMPARISON: Earlier on 11/14/2023   ACCESSION NUMBER(S): [id number]   ORDERING CLINICIAN: PRICILLA AGUIRRE   TECHNIQUE: Views: AP, Lateral, Oblique    FINDINGS: Interval reduction of right ankle dislocation. The tibiotalar joint appears intact. Mild widening of the medial clear space. There is a posterior malleolar fracture which is not well-visualized on the given views. The medial malleolus appears intact. Overlying cast.       Interval reduction of right ankle dislocation which is now located. There is mild widening of the medial clear space. Mildly displaced fracture through the mid-distal diaphysis of the right fibula, in markedly improved anatomic position. Posterior malleolar fracture of the tibia not well-visualized on the given view.   Signed by: Thomas Bagley 11/14/2023 3:04 PM Dictation workstation:   [id number]    XR ankle right 3+ views    Result Date: 11/14/2023  Interpreted By:  Thomas Bagley, STUDY: XR ANKLE RIGHT 3+ VIEWS; 11/14/2023 12:42 pm   INDICATION: Signs/Symptoms:injury;   COMPARISON: None available.   ACCESSION NUMBER(S): [id number]   ORDERING CLINICIAN: BARBARA GRUBER   TECHNIQUE: Views: AP, Lateral, Oblique   FINDINGS: Complete dislocation of right ankle tibiotalar joint. There is displaced and angulated fracture through the distal diaphysis of the right fibula. The medial malleolus is not well visualized on the given views neither is the posterior malleolus of the tibia although a fracture fragment is seen on the AP view concerning for a trimalleolar fracture. Cross-sectional imaging can be obtained as deemed clinically warranted.       Complete dislocation of the right ankle, tibiotalar joint. Displaced and angulated fracture through the distal diaphysis of the right fibula. Probable trimalleolar fracture although limited on plain film evaluation, consider cross-sectional imaging as deemed clinically warranted.   Signed by: Thomas Bagley 11/14/2023 1:43 PM Dictation workstation:   [id number]            Assessment/Plan      Principal Problem:    Displaced comminuted fracture of shaft of right fibula, initial encounter for  closed fracture  Active Problems:    Closed trimalleolar fracture of right ankle    Displaced comminuted fracture of shaft of right fibula  S/p open reduction internal fixation fibula, open reduction internal fixation posterior malleolus, syndesmotic reduction by Dr. Bae on 11/15/23  Pain management  Bowel regimen  Incentive spirometer  PT/OT  Podiatry managing, appreciate recommendations    Hypertension  Blood pressure remains on the lower side of normal  Continue to hold antihypertensives for now    Hypothyroidism  Resume home levothyroxine    Plan  Pain management/bowel regimen  Encourage incentive spirometer  PT/OT to see  Podiatry following  DVT prophylaxis: Heparin  CBC and BMP in AM                Cary Mcneal, APRN-CNP

## 2023-11-15 NOTE — NURSING NOTE
Patient washed up with CHG wipes in prep for surgery today.  Splint to R leg intact.  Circulation checks completed.  Pedal pulse palpable 2+, cap refill less than 3 seconds, able to wiggle toes with no pain/difficulties.  Slight edema observed to toes.  Wife at bedside.

## 2023-11-15 NOTE — NURSING NOTE
Patient returned to room.  Slightly drowsy post op.  3L oxygen via n/c intact and functioning.  No SOB/chest pain noted.  R foot in cast, elevated on pillows with ice pack underneath knee.  Circulation checks WNL.  Min pain noted to R foot.  Patient educated to call nurse for pain meds as needed.  Will continue to monitor.   Wife at bedside.

## 2023-11-15 NOTE — ANESTHESIA PROCEDURE NOTES
Peripheral Block    Patient location during procedure: pre-op  Start time: 11/15/2023 10:18 AM  End time: 11/15/2023 10:19 AM  Reason for block: at surgeon's request and post-op pain management  Staffing  Performed: attending   Authorized by: Angel Kidd DO    Performed by: Angel Kidd DO  Preanesthetic Checklist  Completed: patient identified, IV checked, site marked, risks and benefits discussed, surgical consent, monitors and equipment checked, pre-op evaluation and timeout performed   Timeout performed at: 11/15/2023 10:12 AM  Peripheral Block  Patient position: laying flat  Prep: ChloraPrep  Patient monitoring: heart rate, cardiac monitor and continuous pulse ox  Block type: popliteal  Laterality: right  Injection technique: single-shot  Guidance: ultrasound guided  Infiltration strength: 0.5 %  Dose: 20 mL  Needle  Needle gauge: 22 G  Needle length: 10 cm  Needle localization: ultrasound guidance  Assessment  Injection assessment: negative aspiration for heme, no paresthesia on injection, incremental injection and local visualized surrounding nerve on ultrasound  Paresthesia pain: none  Heart rate change: no  Slow fractionated injection: no  Additional Notes  With 5mg decadron

## 2023-11-15 NOTE — CARE PLAN
Problem: Pain - Adult  Goal: Verbalizes/displays adequate comfort level or baseline comfort level  Outcome: Progressing     Problem: Safety - Adult  Goal: Free from fall injury  Outcome: Progressing     Problem: Discharge Planning  Goal: Discharge to home or other facility with appropriate resources  Outcome: Progressing     Problem: Chronic Conditions and Co-morbidities  Goal: Patient's chronic conditions and co-morbidity symptoms are monitored and maintained or improved  Outcome: Progressing     Problem: Pain  Goal: Takes deep breaths with improved pain control throughout the shift  Outcome: Progressing  Goal: Turns in bed with improved pain control throughout the shift  Outcome: Progressing  Goal: Walks with improved pain control throughout the shift  Outcome: Progressing  Goal: Performs ADL's with improved pain control throughout shift  Outcome: Progressing  Goal: Participates in PT with improved pain control throughout the shift  Outcome: Progressing  Goal: Free from opioid side effects throughout the shift  Outcome: Progressing  Goal: Free from acute confusion related to pain meds throughout the shift  Outcome: Progressing     Problem: Fall/Injury  Goal: Not fall by end of shift  Outcome: Progressing  Goal: Be free from injury by end of the shift  Outcome: Progressing  Goal: Verbalize understanding of personal risk factors for fall in the hospital  Outcome: Progressing  Goal: Verbalize understanding of risk factor reduction measures to prevent injury from fall in the home  Outcome: Progressing  Goal: Use assistive devices by end of the shift  Outcome: Progressing  Goal: Pace activities to prevent fatigue by end of the shift  Outcome: Progressing   The patient's goals for the shift include pain control    The clinical goals for the shift include manage pain    Over the shift, the patient did make progress toward the goals.

## 2023-11-15 NOTE — NURSING NOTE
Took over care of pt at this time. Pt laying in bed, alert, aox4. Pt acknowledges RLE pain but denies need for pain intervention at this time. Pt has no needs or complaints at this time. RLE elevated with dressing c/d/intact with ice pack against dressing. Pt states he was not that hungry or in the mood for dinner, so he did not eat. Pt was receptive to small snacks like crackers and a coke, so I provided him with two cokes and an assortment of crackers and a pack of cookies. I also refill his ice pack and ice water cup/pitcher. Pt has no other needs or complaints at this time. Pt oriented to call bell and it and belongings are placed in reach. Telemetry leads connected and reading on monitor. Bed alarm refused. Continuing to monitor.

## 2023-11-15 NOTE — BRIEF OP NOTE
Date: 2023 - 11/15/2023  OR Location: [facility] OR    Name: Chidi Campos : 1950, Age: 73 y.o., MRN: [id number], Sex: male    Diagnosis  Pre-op Diagnosis     * Closed trimalleolar fracture of right ankle, initial encounter [S82.851A] Post-op Diagnosis     * Closed trimalleolar fracture of right ankle, initial encounter [S82.851A]     Procedures  Open Reduction Internal Fixation Fibula  24096 - DC OPEN TREATMENT FRACTURE DISTAL TIBIA & FIBULA  Open reduction internal fixation of distal tibiofibular syndesmosis    Surgeons      * Segun Bae - Primary    Resident/Fellow/Other Assistant:  Surgeon(s) and Role:  Marcy Jackson DPM PGY3  Procedure Summary  Anesthesia: General  ASA: III  Anesthesia Staff: Anesthesiologist: Angel Kidd DO  C-AA: MANUELITO Sanderson  Estimated Blood Loss: 50mL  Intra-op Medications:   Medication Name Total Dose   lactated Ringer's infusion 780 mL   ondansetron (Zofran) injection 4 mg 4 mg   fentaNYL PF (Sublimaze) injection 50 mcg 25 mcg              Anesthesia Record               Intraprocedure I/O Totals          Intake    Propofol Drip 0.00 mL    The total shown is the total volume documented since Anesthesia Start was filed.    lactated Ringer's infusion 1000.00 mL    Total Intake 1000 mL          Specimen: No specimens collected     Staff:   Circulator: Listete Mcginnis RN; Jaylin Foster RN  Scrub Person: Leroy Lizama RN          Findings: consistent with pre-op diagnosis    Complications:  None; patient tolerated the procedure well.     Disposition: PACU - hemodynamically stable.  Condition: stable  Specimens Collected: No specimens collected  Attending Attestation: I performed the procedure.    Segun Bae  Phone Number: [phone]

## 2023-11-15 NOTE — CARE PLAN
The patient's goals for the shift include pain control    The clinical goals for the shift include manage pain, surgery    Over the shift, the patient did not make progress toward the following goals. Barriers to progression include pain from surgery. Recommendations to address these barriers include follow MD orders, medicate as needed.

## 2023-11-15 NOTE — ANESTHESIA PROCEDURE NOTES
Airway  Date/Time: 11/15/2023 10:54 AM  Urgency: elective    Airway not difficult    Staffing  Performed: MANUELITO   Authorized by: Angel Kidd DO    Performed by: MANUELITO Sanderson  Patient location during procedure: OR    Indications and Patient Condition  Indications for airway management: anesthesia and airway protection  Spontaneous Ventilation: absent  Sedation level: deep  Preoxygenated: yes  Patient position: sniffing  Mask difficulty assessment: 1 - vent by mask  Planned trial extubation    Final Airway Details  Final airway type: endotracheal airway      Successful airway: ETT  Cuffed: yes   Successful intubation technique: direct laryngoscopy  Facilitating devices/methods: intubating stylet  Blade: Anmol  Blade size: #3  ETT size (mm): 8.0  Cormack-Lehane Classification: grade I - full view of glottis  Placement verified by: chest auscultation, capnometry and palpation of cuff   Measured from: lips  ETT to lips (cm): 21  Number of attempts at approach: 1

## 2023-11-15 NOTE — ANESTHESIA POSTPROCEDURE EVALUATION
Patient: Chidi Campos    Procedure Summary       Date: 11/15/23 Room / Location: [facility] OR 08 / [facility]    Anesthesia Start: 1048 Anesthesia Stop: 1419    Procedure: Open Reduction Internal Fixation Fibula, OPEN REDUCTON INTERNAL FIXATION POSTERIOR MALLEOLUS, SYNDESMOTIC REDUCTION (Right: Ankle) Diagnosis:       Closed trimalleolar fracture of right ankle, initial encounter      (Closed trimalleolar fracture of right ankle, initial encounter [S82.208N])    Surgeons: Segun Bae DPM Responsible Provider: Angel Kidd DO    Anesthesia Type: general, regional ASA Status: 3            Anesthesia Type: general, regional    Vitals Value Taken Time   /77 11/15/23 1501   Temp 37.1 °C (98.8 °F) 11/15/23 1500   Pulse 89 11/15/23 1505   Resp 21 11/15/23 1502   SpO2 98 % 11/15/23 1501   Vitals shown include unvalidated device data.    Anesthesia Post Evaluation    Patient location during evaluation: bedside  Patient participation: complete - patient participated  Level of consciousness: awake and alert  Pain scale: Pain adequately controlled, and orders in place for continued pain control.  Pain management: adequate  Multimodal analgesia pain management approach  Airway patency: patent  Two or more strategies used to mitigate risk of obstructive sleep apnea  Cardiovascular status: acceptable  Respiratory status: acceptable  Hydration status: acceptable    Comments: No postop nausea, or no postop nausea or vomiting that has gone untreated        There were no known notable events for this encounter.

## 2023-11-15 NOTE — PROGRESS NOTES
Patient seen preoperatively.  Splint dressing removed.  Pulses palpable, capillary refill time immediate to all digits, mild edema surrounding ankle, +wrinkle test.  No fracture blisters.  Planned procedure today includes open reduction and internal fixation of right ankle fracture.  Discussed risks, benefits, nature, alternatives, potential complications, and postoperative management.  All patient's questions were answered to his apparent satisfaction.  No guarantees were given as to the outcome of the current treatment plan.  Patient fully understands and elects to proceed, consent form signed.    Segun Bae DPM  11/15/23  10:24 AM

## 2023-11-15 NOTE — NURSING NOTE
Rounded on pt. Pt laying in bed, eyes closed, respirations even and unlabored. Pt appears to be sleeping and in no apparent pain or distress. RLE elevated with dressing c/d/intact with ice pack against dressing. Call bell and belongings are placed in reach. Telemetry leads connected and reading on monitor. Bed alarm refused. Continuing to monitor.

## 2023-11-15 NOTE — ANESTHESIA PREPROCEDURE EVALUATION
Patient: Chidi Campos    Procedure Information       Date/Time: 11/15/23 1030    Procedure: Open Reduction Internal Fixation Fibula (Right: Ankle)    Location: [facility] 08 / [facility]    Surgeons: Segun Bae DPM            Relevant Problems   Anesthesia (within normal limits)      Cardiovascular   (+) Essential hypertension   (+) Hyperlipidemia      Endocrine   (+) Hypothyroidism      Neuro/Psych   (+) Bell's palsy   (+) Cerebrovascular accident (CMS/HCC)       Clinical information reviewed:   Tobacco  Allergies  Meds   Med Hx  Surg Hx   Fam Hx  Soc Hx        NPO Detail:  No data recorded     Physical Exam    Airway  Mallampati: III  TM distance: >3 FB  Neck ROM: full     Cardiovascular   Comments: Deferred   Dental   Comments: 1-2 loose teeth top Right side of mouth   Pulmonary   Comments: Deferred   Abdominal     Comments: Deferred           Anesthesia Plan    ASA 3     general and regional     The patient is not a current smoker.  Patient was not previously instructed to abstain from smoking on day of procedure.  Patient did not smoke on day of procedure.  Education provided regarding risk of obstructive sleep apnea.  intravenous induction   Postoperative administration of opioids is intended.  Anesthetic plan and risks discussed with patient.  Use of blood products discussed with patient who consented to blood products.    Plan discussed with CRNA and CAA.

## 2023-11-15 NOTE — NURSING NOTE
Rounded on pt. Pt laying in bed, eyes closed, respirations even and unlabored. Pt appears to be sleeping and in no apparent pain or distress. RLE elevated with dressing c/d/intact with ice pack against dressing. Uneventful night with no changes since prior assessment. Call bell and belongings are placed in reach. Telemetry leads connected and reading on monitor. Bed alarm refused. Continuing to monitor.

## 2023-11-15 NOTE — OP NOTE
Open Reduction Internal Fixation Fibula, OPEN REDUCTON INTERNAL FIXATION POSTERIOR MALLEOLUS, SYNDESMOTIC REDUCTION (R) Operative Note     Date: 2023 - 11/15/2023  OR Location: [facility]    Name: Chidi Campos : 1950, Age: 73 y.o., MRN: [id number], Sex: male    Diagnosis  Pre-op Diagnosis     * Closed trimalleolar fracture of right ankle, initial encounter [S82.851A] Post-op Diagnosis     * Closed trimalleolar fracture of right ankle, initial encounter [S82.851A]     Procedures  Open Reduction Internal Fixation Fibula, OPEN REDUCTON INTERNAL FIXATION POSTERIOR MALLEOLUS, SYNDESMOTIC REDUCTION  [address] - [place] OPEN TREATMENT FRACTURE DISTAL TIBIA & FIBULA      Surgeons      * Segun Bae - Primary    Resident/Fellow/Other Assistant:  Surgeon(s) and Role:    Procedure Summary  Anesthesia: General  ASA: III  Anesthesia Staff: Anesthesiologist: Angel Kidd DO  C-AA: MANUELITO Sanderson  Estimated Blood Loss: 50mL  Intra-op Medications:   Medication Name Total Dose   lactated Ringer's infusion 780 mL   ondansetron (Zofran) injection 4 mg 4 mg   fentaNYL PF (Sublimaze) injection 50 mcg 25 mcg              Anesthesia Record               Intraprocedure I/O Totals          Intake    Propofol Drip 0.00 mL    The total shown is the total volume documented since Anesthesia Start was filed.    lactated Ringer's infusion 1000.00 mL    Total Intake 1000 mL          Specimen: No specimens collected     Staff:   Circulator: Lisette Mcginnis RN; Jaylin Foster RN  Scrub Person: Leroy Lizama RN         Drains and/or Catheters: * None in log *    Tourniquet Times:   * Missing tourniquet times found for documented tourniquets in lo *     Implants:  Implants       Type Name Action Serial No.       ORTHOLOC 3DI POSTERIOR TIBIA PLATE, SMALL Implanted      Screw SCREW, LOWPROFILE, CORTICAL, 3.5 X 32MM - [id number] Implanted       3.5MM X 42MM ORTHOLOC 3DI LOW-PROFILE CORTICAL SCREW Implanted       3.5MM  X 42MM ORTHOLOC 3DI LOCKING SCREW Implanted      Screw SCREW, LOWPROFILE, CORTICAL, 3.5 X 28MM - [id number] Implanted       ORTHOLOC 3DI STRAIGHT TUBULAR PLATE, 8HOLES Implanted       3.5MM X 12MM ORTHOLOC 3DI LOW-PROFILE CORTICAL SCREW Implanted               Findings: consistent with pre-op diagnosis    Indications: Chidi Campos is an 73 y.o. male who is having surgery for Closed trimalleolar fracture of right ankle, initial encounter [S82.792T].     The patient was seen in the preoperative area. The risks, benefits, complications, treatment options, non-operative alternatives, expected recovery and outcomes were discussed with the patient. The possibilities of reaction to medication, pulmonary aspiration, injury to surrounding structures, bleeding, recurrent infection, the need for additional procedures, failure to diagnose a condition, and creating a complication requiring transfusion or operation were discussed with the patient. The patient concurred with the proposed plan, giving informed consent.  The site of surgery was properly noted/marked if necessary per policy. The patient has been actively warmed in preoperative area. Preoperative antibiotics have been ordered and given within 1 hours of incision. Venous thrombosis prophylaxis are not indicated.    Patient seen preoperatively.  Planned procedure today includes open reduction internal fixation of right ankle fracture.  I had a long discussion with patient and family preoperatively regarding risks, benefits, nature, alternatives, potential complications, and postoperative management.  Risks and potential complications including, but not limited to; infection, wound healing complications, bone healing complications including nonunion, delayed union, malunion, arthrosis, chronic pain, gait disturbances, instability, the need for additional surgery, DVT, PE.  All patient's questions were answered to his apparent satisfaction.  No guarantees were given as  to the outcome of the current treatment plan.  Patient fully understands and elects to proceed.  Consent form signed.    Procedure Details: After having received preoperative antibiotics the patient was taken to the operating room where the aformentioned general anesthesia was obtained.  The patient was then positioned in the prone position.  The right foot, ankle, lower leg were then prepped and draped in usual sterile fashion and exsanguinated with an Esmarch bandage.  The thigh tourniquet was then inflated.  At this time a incision was made midline between the lateral border of the Achilles tendon and the posterior border of the fibula.  Dissection for sharply with a #15 blade then bluntly down through the tissues.  All small bleeders were cauterized as appropriate.  Layered dissection was then carried down where the sural nerve was identified and safely retracted throughout the remainder of the procedure.  Dissection was then carried down between the peroneal muscle belly and flexor hallucis longus muscle belly.  Dissection was then carried down to the posterior border of the tibia where the posterior malleolus fracture was identified.  A periosteal incision was made thereby exposing the fracture of the posterior malleolus.  The fracture hematoma was evacuated using copious irrigation with normal saline.  The fracture was distracted, several small interposed osseous fragments were removed.  The fracture was then reduced and temporarily stabilized with K wires.  Multiple C-arm images showed satisfactory alignment with good reduction of the posterior malleolus fracture.  While maintaining alignment and reduction a T shaped plate was then positioned over the posterior border of the tibia.  The plate was then filled with two 3.5 mm locking and nonlocking screws distally through the fracture fragment and to the anterior border of the tibia.  Care was taken to ensure the screws did not violate the ankle joint.  An  additional two 3.5 mm nonlocking bicortical screws were inserted proximal to the fracture using standard AO technique and anti-glide fashion.  Multiple C-arm images showed satisfactory alignment and good reduction of the posterior malleolus fracture.      Next, dissection was then carried to the posterior border of the fibula.  The fracture hematoma was evacuated using copious irrigation with normal saline.  The fracture was manually reduced and temporary stabilized.  Multiple C-arm images showed satisfactory alignment of the distal fibular fracture.  Fibular length was maintained.  While maintaining reduction and alignment of the distal fibular fracture a one third tubular plate was then positioned over the posterolateral border of the distal fibula.  The plate was then filled with several3.5 millimeter bi-cortical screws proximal and distal to the fracture using standard AO technique.  Multiple C-arm images showed satisfactory alignment and good reduction of the distal fibular fracture.    Next, the decision was then made to proceed with reduction and fixation of the distal syndesmosis to further stabilize the ankle.  Two 3.5 millimeter screws were inserted in quad cortical fashion from the lateral border of the fibula, across the syndesmosis, and to the medial tibial cortex according to standard AO technique.  Multiple C-arm images confirmed satisfactory alignment of the ankle mortise with good reduction.  Under live fluoroscopic guidance the ankle was then manually stressed, the ankle remained stable to all testing. There was no distal tibiofibular syndesmotic or medial joint widening seen.  Final C-arm images were obtained.  The wound was then irrigated with copious amounts of normal saline.  1 g vancomycin powder was added to the wound for topical prophylactic antibiosis.  Deep, layered closure with 3-0 and 4-0 Vicryl.  The tourniquet was deflated prior to final closure.  Excellent hemostasis was maintained,  good capillary refill time and vascular status immediate return.  The skin was then closed with 3-0 nylon in simple interrupted fashion.  A dry sterile dressing consisting of Xeroform, gauze, multiple ABDs, with an overlying modified Chisholm compressive dressing and fiberglass posterior splint was then applied to the right lower extremity.  Patient tolerated the procedure and anesthesia well, left the operating room with vital signs stable and neurovascular status intact.  After period of postoperative monitoring the patient will be readmitted to the floor for continued management.  Emergency contact numbers have made available to the patient and family share problems or questions arise  Complications:  None; patient tolerated the procedure well.    Disposition: PACU - hemodynamically stable.  Condition: stable         Attending Attestation: I performed the procedure.    Segun Bae  Phone Number: [phone]

## 2023-11-16 ENCOUNTER — PHARMACY VISIT (OUTPATIENT)
Dept: PHARMACY | Facility: CLINIC | Age: 73
End: 2023-11-16
Payer: MEDICARE

## 2023-11-16 VITALS
HEART RATE: 100 BPM | SYSTOLIC BLOOD PRESSURE: 146 MMHG | RESPIRATION RATE: 15 BRPM | HEIGHT: 67 IN | BODY MASS INDEX: 26.37 KG/M2 | TEMPERATURE: 97.9 F | WEIGHT: 167.99 LBS | OXYGEN SATURATION: 100 % | DIASTOLIC BLOOD PRESSURE: 75 MMHG

## 2023-11-16 PROCEDURE — RXMED WILLOW AMBULATORY MEDICATION CHARGE

## 2023-11-16 PROCEDURE — 2500000005 HC RX 250 GENERAL PHARMACY W/O HCPCS: Performed by: PODIATRIST

## 2023-11-16 PROCEDURE — 97161 PT EVAL LOW COMPLEX 20 MIN: CPT | Mod: GP

## 2023-11-16 PROCEDURE — 97165 OT EVAL LOW COMPLEX 30 MIN: CPT | Mod: GO

## 2023-11-16 PROCEDURE — 2500000004 HC RX 250 GENERAL PHARMACY W/ HCPCS (ALT 636 FOR OP/ED): Performed by: PODIATRIST

## 2023-11-16 PROCEDURE — 96372 THER/PROPH/DIAG INJ SC/IM: CPT | Performed by: PODIATRIST

## 2023-11-16 PROCEDURE — 2500000002 HC RX 250 W HCPCS SELF ADMINISTERED DRUGS (ALT 637 FOR MEDICARE OP, ALT 636 FOR OP/ED): Performed by: PODIATRIST

## 2023-11-16 PROCEDURE — 97530 THERAPEUTIC ACTIVITIES: CPT | Mod: GP

## 2023-11-16 RX ORDER — ASPIRIN 325 MG
325 TABLET, DELAYED RELEASE (ENTERIC COATED) ORAL DAILY
Qty: 30 TABLET | Refills: 0 | Status: SHIPPED | OUTPATIENT
Start: 2023-11-16 | End: 2024-04-10 | Stop reason: WASHOUT

## 2023-11-16 RX ORDER — ACETAMINOPHEN 325 MG/1
650 TABLET ORAL EVERY 6 HOURS PRN
Qty: 30 TABLET | Refills: 0
Start: 2023-11-16 | End: 2023-11-28 | Stop reason: WASHOUT

## 2023-11-16 RX ORDER — POLYETHYLENE GLYCOL 3350 17 G/17G
17 POWDER, FOR SOLUTION ORAL DAILY
Qty: 510 G | Refills: 0 | Status: SHIPPED | OUTPATIENT
Start: 2023-11-17

## 2023-11-16 RX ORDER — OXYCODONE AND ACETAMINOPHEN 5; 325 MG/1; MG/1
1 TABLET ORAL EVERY 6 HOURS PRN
Qty: 28 TABLET | Refills: 0 | Status: SHIPPED | OUTPATIENT
Start: 2023-11-16 | End: 2023-11-23

## 2023-11-16 RX ORDER — OXYCODONE AND ACETAMINOPHEN 5; 325 MG/1; MG/1
TABLET ORAL
Qty: 28 TABLET | Refills: 0 | OUTPATIENT
Start: 2023-11-16 | End: 2023-11-16 | Stop reason: HOSPADM

## 2023-11-16 RX ADMIN — POLYETHYLENE GLYCOL 3350 17 G: 17 POWDER, FOR SOLUTION ORAL at 08:52

## 2023-11-16 RX ADMIN — LEVOTHYROXINE SODIUM 88 MCG: 0.09 TABLET ORAL at 06:22

## 2023-11-16 RX ADMIN — Medication: at 08:00

## 2023-11-16 RX ADMIN — HEPARIN SODIUM 5000 UNITS: 5000 INJECTION, SOLUTION INTRAVENOUS; SUBCUTANEOUS at 06:22

## 2023-11-16 ASSESSMENT — COGNITIVE AND FUNCTIONAL STATUS - GENERAL
CLIMB 3 TO 5 STEPS WITH RAILING: A LITTLE
MOBILITY SCORE: 16
TURNING FROM BACK TO SIDE WHILE IN FLAT BAD: A LITTLE
DRESSING REGULAR LOWER BODY CLOTHING: A LITTLE
STANDING UP FROM CHAIR USING ARMS: A LITTLE
HELP NEEDED FOR BATHING: A LITTLE
MOBILITY SCORE: 23
DAILY ACTIVITIY SCORE: 22
MOVING TO AND FROM BED TO CHAIR: A LITTLE
WALKING IN HOSPITAL ROOM: A LOT
CLIMB 3 TO 5 STEPS WITH RAILING: TOTAL

## 2023-11-16 ASSESSMENT — ACTIVITIES OF DAILY LIVING (ADL)
EFFECT OF PAIN ON DAILY ACTIVITIES: NO IMPACT
ADL_ASSISTANCE: INDEPENDENT
ADL_ASSISTANCE: INDEPENDENT
ADLS_ADDRESSED: NO

## 2023-11-16 ASSESSMENT — PAIN - FUNCTIONAL ASSESSMENT
PAIN_FUNCTIONAL_ASSESSMENT: FLACC (FACE, LEGS, ACTIVITY, CRY, CONSOLABILITY)
PAIN_FUNCTIONAL_ASSESSMENT: 0-10
PAIN_FUNCTIONAL_ASSESSMENT: 0-10
PAIN_FUNCTIONAL_ASSESSMENT: FLACC (FACE, LEGS, ACTIVITY, CRY, CONSOLABILITY)
PAIN_FUNCTIONAL_ASSESSMENT: 0-10

## 2023-11-16 ASSESSMENT — PAIN DESCRIPTION - DESCRIPTORS: DESCRIPTORS: DISCOMFORT;NAGGING

## 2023-11-16 ASSESSMENT — PAIN SCALES - GENERAL
PAINLEVEL_OUTOF10: 0 - NO PAIN
PAINLEVEL_OUTOF10: 0 - NO PAIN
PAINLEVEL_OUTOF10: 4
PAINLEVEL_OUTOF10: 0 - NO PAIN

## 2023-11-16 NOTE — NURSING NOTE
Rounded on pt. Pt laying in bed, eyes closed, respirations even and unlabored. Pt appears to be sleeping and in no apparent pain or distress. RLE elevated on pillow with ice pack under knee. Dressing is c/d/I, toes warm, pulse palpable below right great toe.  Call bell and belongings are placed in reach. Telemetry leads connected and reading on monitor. NC O2 in place and connected to flowmeter. Bed alarm set. Continuing to monitor.

## 2023-11-16 NOTE — PROGRESS NOTES
Pt with active dc order, therapy did eval the pt and found him to have no needs. Pt will return home with spouse.

## 2023-11-16 NOTE — PROGRESS NOTES
Physical Therapy    Physical Therapy Evaluation & Treatment    Patient Name: Chidi Campos  MRN: [id number]  Today's Date: 11/16/2023   Time Calculation  Start Time: 1030  Stop Time: 1050  Time Calculation (min): 20 min    Assessment/Plan   PT Assessment  PT Assessment Results: Orthopedic restrictions  Rehab Prognosis: Excellent  Barriers to Discharge: none  Evaluation/Treatment Tolerance: Patient tolerated treatment well  Medical Staff Made Aware: Yes  Strengths: Ability to acquire knowledge, Access to adaptive/assistive products, Attitude of self, Housing layout, Living arrangement secure, Premorbid level of function, Support of Caregivers  Barriers to Participation: Other (Comment) (none)  End of Session Communication: Bedside nurse  Assessment Comment: pt is modified independent with mobiltiy with standard walker. pt demonstrates safe use of the walker and very good understanding of current limitations. pt has all needed DME at home and strong family support. pt with no need for further acute skilled physical therapy services.  End of Session Patient Position: Up in chair, Alarm off, not on at start of session   IP OR SWING BED PT PLAN  Inpatient or Swing Bed: Inpatient  PT Plan  Treatment/Interventions: Gait training, Therapeutic activity  PT Plan: PT Eval only  PT Eval Only Reason: Safe to return home  PT Frequency: PT eval only  PT Discharge Recommendations: No PT needed after discharge, No further acute PT  Equipment Recommended upon Discharge: Standard walker  PT Recommended Transfer Status: Assistive device, Independent (standard walker)  PT - OK to Discharge: Yes      Subjective     General Visit Information:  General  Reason for Referral: Recent Surgery R foot/ankle  Referred By: Segun Bae DPM  Past Medical History Relevant to Rehab: HTN, hypothyroidism, bell's palsy  Family/Caregiver Present: Yes  Caregiver Feedback: spouse present and supportive  Co-Treatment: OT  Co-Treatment Reason: for  caregiver and patient safety for initial evaluation ofpost-surgical patient who is NWB R LE  Prior to Session Communication: Bedside nurse  Patient Position Received: Bed, 2 rail up  Preferred Learning Style: verbal  General Comment: 73 y.o. male with a displaced comminuted fracture of shaft of R fibula and trimalleolar ankle fracture. Pt now s/p open reduction internal fixation fibula, open reduction internal fixation posterior malleolus, syndesmotic reduction by Dr. Bae on 11/15/23.  Home Living:  Home Living  Type of Home: House  Lives With: Spouse  Home Adaptive Equipment: Walker rolling or standard  Home Layout: One level  Home Access: Stairs to enter with rails  Entrance Stairs-Rails: Right  Entrance Stairs-Number of Steps: 4  Bathroom Shower/Tub: Tub/shower unit  Bathroom Toilet: Handicapped height  Bathroom Equipment: Grab bars in shower  Home Living Comments: can use back entrance and eliminate 2 stairs.  Prior Level of Function:  Prior Function Per Pt/Caregiver Report  Level of [place]: Independent with ADLs and functional transfers, Independent with homemaking with ambulation  Receives Help From: Family  ADL Assistance: Independent  Homemaking Assistance: Independent  Ambulatory Assistance: Independent  Vocational: Retired  Leisure: Pt is very active.  Hand Dominance: Right  Prior Function Comments: Pt independent with all ADLs, IADLs, and functional mobility. Pt active, completing yardwork, driving, etc.  Precautions:  Precautions  Hearing/Visual Limitations: wears glasses occasionally  LE Weight Bearing Status: Right Non-Weight Bearing  Medical Precautions: Fall precautions  Precautions Comment: R LE posterior splint in place  Vital Signs:  Vital Signs  Heart Rate:  (up to 145 bpm with activity, back to 110 bpm after resting.)  Heart Rate Source: Monitor  SpO2: 96 % (on RA)  Patient Position: Standing    Objective   Pain:  Pain Assessment  Pain Assessment: 0-10  Pain Score: 4  Pain Type: Surgical  pain  Pain Location: Ankle  Pain Orientation: Right  Pain Descriptors: Discomfort, Nagging  Pain Interventions: Elevated  Response to Interventions: positive response with decreased discomfort  Cognition:  Cognition  Overall Cognitive Status: Impaired  Orientation Level: Oriented X4  Safety Judgment: Good awareness of safety precautions    General Assessments:  General Observation  General Observation: pt able to wiggle right toes, toes are warm to touch     Activity Tolerance  Endurance: Endurance does not limit participation in activity  Activity Tolerance Comments: very good    Sensation  Sensation Comment: denies paresthesias    Coordination  Movements are Fluid and Coordinated: Yes    Postural Control  Posture Comment: WNL    Static Sitting Balance  Static Sitting-Level of Assistance: Independent    Static Standing Balance  Static Standing-Level of Assistance: Independent  Functional Assessments:  ADL  ADL's Addressed: No    Bed Mobility  Bed Mobility:  (modified independent to transfer to sitting on EOB, HOB elevated)    Transfers  Transfer:  (verbal cues for hand placement with good carryover with subsequent transfer sit<->stand)    Ambulation/Gait Training  Ambulation/Gait Training Performed:  (15'x2 with distant superivsion and standard walker. one onstruction on walker proximity with very good carry over, able to maintain NWB R LE with very good control of walker, self, and R LE. pt with very good balance.)    Stairs  Stairs:  (negotiated one platform step, backwards with verbal cues for safety, good control with stepping back/up with left LE, controlled descent when stepping left foot down.)  Extremity/Trunk Assessments:  RLE   RLE : Within Functional Limits (except ankle)  LLE   LLE : Within Functional Limits  Treatments:  Therapeutic Activity  Therapeutic Activity Performed:  (Reinforced R LE elevation at home, ice to right calf. pt able to transfer sit<->stand safely without verbal cues at end of  session.)  Outcome Measures:  [facility] Basic Mobility  Turning from your back to your side while in a flat bed without using bedrails: None  Moving from lying on your back to sitting on the side of a flat bed without using bedrails: None  Moving to and from bed to chair (including a wheelchair): None  Standing up from a chair using your arms (e.g. wheelchair or bedside chair): None  To walk in hospital room: None  Climbing 3-5 steps with railing: A little  Basic Mobility - Total Score: 23    Encounter Problems       Encounter Problems (Active)       Pain - Adult             Encounter Problems (Resolved)       Mobility       STG - Patient will ambulate 15' with standard walker, [place], distant supervision. (Met)       Start:  11/16/23    Expected End:  11/17/23    Resolved:  11/16/23                Education Documentation  Mobility Training, taught by Fe Hines PT at 11/16/2023 11:54 AM.  Learner: Significant Other, Patient  Readiness: Acceptance  Method: Explanation  Response: Verbalizes Understanding    Education Comments  No comments found.

## 2023-11-16 NOTE — PROGRESS NOTES
Occupational Therapy    Evaluation    Patient Name: Chidi Campos  MRN: [id number]  Today's Date: 11/16/2023  Time Calculation  Start Time: 1025  Stop Time: 1045  Time Calculation (min): 20 min        Assessment:  OT Assessment: Pt demonstrates good standing balance, strength, and ability to participate in ADLs. OT to address task modification to promote and optimize independence with functional tasks given new WB'ing status. Recommend d/c to home with support from family, tub bench.  Prognosis: Excellent  Barriers to Discharge: None  Evaluation/Treatment Tolerance: Patient tolerated treatment well  End of Session Communication: Bedside nurse  End of Session Patient Position: Up in chair, Alarm off, not on at start of session (Call bell in reach)  OT Assessment Results: Decreased ADL status  Prognosis: Excellent  Barriers to Discharge: None  Evaluation/Treatment Tolerance: Patient tolerated treatment well  Strengths: Ability to acquire knowledge, Access to adaptive/assistive products, Attitude of self, Housing layout, Support and attitude of living partners  Plan:  Treatment Interventions: ADL retraining  No Skilled OT: Safe to return home  OT Frequency: 1 time per week  OT Discharge Recommendations: No OT needed after discharge, Other (Comment) (assist from family)  Equipment Recommended upon Discharge: Other (comment), Standard walker (Tub bench)  OT Recommended Transfer Status: Stand by assist  OT - OK to Discharge: Yes  Treatment Interventions: ADL retraining    Subjective   Current Problem:  1. Displaced comminuted fracture of shaft of right fibula, initial encounter for closed fracture        2. Closed trimalleolar fracture of right ankle, initial encounter  Case Request Operating Room: Open Reduction Internal Fixation Fibula    Case Request Operating Room: Open Reduction Internal Fixation Fibula    oxyCODONE-acetaminophen (Percocet) 5-325 mg tablet        General:  General  Reason for Referral: 72yo M PMH HTN,  "hypothyroidism, bell's palsy presenting to ED with ankle pain. Found to have displaced comminuted fracture of shaft of R fibula and trimalleolar ankle fracture. Pt now s/p open reduction internal fixation fibula, open reduction internal fixation posterior malleolus, syndesmotic reduction by Dr. Bae on 11/15/23.  Referred By: Favian  Past Medical History Relevant to Rehab: HTN, hypothyroidism, bell's palsy  Family/Caregiver Present: Yes  Prior to Session Communication: Bedside nurse  Patient Position Received: Bed, 2 rail up  General Comment: Pt cleared and agreeable to participate in OT session.  Precautions:  Hearing/Visual Limitations: wears glasses occasionally  LE Weight Bearing Status: Right Non-Weight Bearing  Medical Precautions: Fall precautions  Vital Signs:  Heart Rate:  (HR: 100-138 during activity with return to 110 with rest.)  Heart Rate Source: Monitor  Pain:  Pain Assessment  Pain Assessment: 0-10  Pain Score:  (Pt stated \"I don't have much pain. When asked a number, pt stated \"8, but I don't really have that much pain.\" Pt in NAD during assessment or c/o inc'd pain)  Pain Type: Acute pain  Pain Location: Ankle  Pain Orientation: Right  Effect of Pain on Daily Activities: No impact    Objective   Cognition:  Overall Cognitive Status: Within Functional Limits  Safety/Judgement: Within Functional Limits           Home Living:  Type of Home: House  Lives With: Spouse  Home Adaptive Equipment: Walker rolling or standard  Home Layout: One level  Home Access: Stairs to enter with rails  Entrance Stairs-Rails: Right  Entrance Stairs-Number of Steps: 4 (2+2)  Bathroom Shower/Tub: Tub/shower unit  Bathroom Toilet: Handicapped height  Bathroom Equipment: Grab bars in shower  Prior Function:  Level of [place]: Independent with ADLs and functional transfers, Independent with homemaking with ambulation  Receives Help From: Family  ADL Assistance: Independent  Homemaking Assistance: " Independent  Ambulatory Assistance: Independent  Vocational: Retired  Leisure: Pt is very active.  Hand Dominance: Right  Prior Function Comments: Pt independent with all ADLs, IADLs, and functional mobility. Pt active, completing yardwork, driving, etc.  IADL History:  Current License: Yes  ADL:  Grooming Assistance: Independent  Grooming Deficit: Setup (Seated to complete oral hygiene, combing hair, washing face)  LE Dressing Assistance: Modified independent (Device)  LE Dressing Deficit: Don/doff L sock  Functional Assistance: Stand by  Functional Deficit: Setup, Supervision/safety  ADL Comments: Pt demonstrates good balance and ability to maintain NWB on RLE. Pt's spouse is at home to assist as needed.  Activity Tolerance:  Activity Tolerance Comments: Pt tolerated activity fairly well. HR elevated to 138BPM during functional mobility with standard walker, with 3 min rest HR 110BPM.  Bed Mobility/Transfers: Bed Mobility  Bed Mobility: Yes  Bed Mobility 1  Bed Mobility 1: Supine to sitting  Level of Assistance 1: Independent    Transfers  Transfer: Yes  Transfer 1  Technique 1: Sit to stand, Stand to sit  Transfer Device 1: Walker  Transfer Level of Assistance 1: Close supervision, Contact guard  Trials/Comments 1: cues provided for hand/foot placement to optimize safety      Ambulation/Gait Training:  Ambulation/Gait Training  Ambulation/Gait Training Performed: Yes  Ambulation/Gait Training 1  Surface 1: Level tile  Device 1: Standard walker  Assistance 1: Close supervision  Comments/Distance (ft) 1: ~2min functional mobility  Sitting Balance:  Static Sitting Balance  Static Sitting-Level of Assistance: Independent  Dynamic Sitting Balance  Dynamic Sitting-Comments: Independent  Standing Balance:  Static Standing Balance  Static Standing-Balance Support: Bilateral upper extremity supported  Static Standing-Level of Assistance: Close supervision  Static Standing-Comment/Number of Minutes: Standard walker  support  Dynamic Standing Balance  Dynamic Standing-Balance Support: Bilateral upper extremity supported  Dynamic Standing-Balance:  (Functional mobility)  Dynamic Standing-Comments: Close supervision with standard walker   Modalities:     Vision:Vision - Basic Assessment  Current Vision: Other (Comment) (Intermittently wears glasses)    Strength:  Strength Comments: B UE strength and ROM WFL  Coordination:  Movements are Fluid and Coordinated: Yes     Outcome Measures:[facility] Daily Activity  Putting on and taking off regular lower body clothing: A little  Bathing (including washing, rinsing, drying): A little  Putting on and taking off regular upper body clothing: None  Toileting, which includes using toilet, bedpan or urinal: None  Taking care of personal grooming such as brushing teeth: None  Eating Meals: None  Daily Activity - Total Score: 22    Education Documentation  Body Mechanics, taught by Chanel Vaughan OT at 11/16/2023 11:13 AM.  Learner: Family, Patient  Readiness: Eager  Method: Explanation, Demonstration  Response: Verbalizes Understanding, Demonstrated Understanding    ADL Training, taught by Chanel Vaughan OT at 11/16/2023 11:13 AM.  Learner: Family, Patient  Readiness: Eager  Method: Explanation, Demonstration  Response: Verbalizes Understanding, Demonstrated Understanding    Education Comments  No comments found.    OP EDUCATION:  Education  Individual(s) Educated: Patient, Spouse    Goals:  Encounter Problems       Encounter Problems (Active)       OT Goals       Pt will perform simulated tub-bench transfer Mod I  (Progressing)       Start:  11/16/23    Expected End:  12/07/23

## 2023-11-16 NOTE — NURSING NOTE
Took over care of pt at this time. Pt laying in bed, alert, aox4. Pt denies pain and has no needs or complaints at this time. RLE elevated on pillow with ice pack under knee. Dressing is c/d/I, toes warm, pulse palpable below right great toe. Pt oriented to call bell and it and belongings are placed in reach. Telemetry leads connected and reading on monitor. Bed alarm set. NC O2 in place and connected to flowmeter. Continuing to monitor.

## 2023-11-16 NOTE — PROGRESS NOTES
"Chidi Campos is a 73 y.o. male on day 2 of admission presenting with Displaced comminuted fracture of shaft of right fibula, initial encounter for closed fracture.    Subjective   Patient seen at bedside day #1 status post ORIF right ankle fracture.  He states he is doing very well.  Pain well controlled.  No acute complaints.  He states he is eager to get home.  Denies nausea, vomiting, fever, chills, calf pain, chest pain, shortness of breath.       Physical Exam    Objective     Patient Aox3, nad  RLE exam:  Dressing in place  Toes warm, pink  CFT immediate  Calf supple.  No pain with calf compression    Last Recorded Vitals  Blood pressure 115/65, pulse 85, temperature 36.4 °C (97.5 °F), temperature source Temporal, resp. rate 16, height 1.7 m (5' 6.93\"), weight 76.2 kg (167 lb 15.9 oz), SpO2 98 %.    Intake/Output last 3 Shifts:  I/O last 3 completed shifts:  In: 2380 (31.2 mL/kg) [P.O.:1146; I.V.:1234 (16.2 mL/kg)]  Out: 4270 (56 mL/kg) [Urine:4245 (1.5 mL/kg/hr); Blood:25]  Weight: 76.2 kg     Relevant Results           Assessment/Plan   Principal Problem:    Displaced comminuted fracture of shaft of right fibula, initial encounter for closed fracture  Active Problems:    Closed trimalleolar fracture of right ankle    Patient seen at bedside.  Day #1 status post ORIF right ankle fracture.  Satisfactory postoperative progress.  Okay for discharge from podiatry standpoint.  Would recommend PT eval prior to discharge.  Nonweightbearing to right lower extremity with crutches or a walker.  Ice, elevation.  Keep dressing clean, dry, intact.  Pain medication as needed.  Once discharged he is to take one 325 mg aspirin tablet daily for VTE prophylaxis.  I will see him in my office next week for surgical follow-up.  Contact information given to patient and family.  They are in full agreement with the current plan.  Call with questions or concerns.  Discussed with RN.  Please call with any questions or concerns.   "     Segun Bae DPM

## 2023-11-16 NOTE — CARE PLAN
The patient's goals for the shift include pain control    The clinical goals for the shift include manage pain, monitor surgical site      Problem: Pain - Adult  Goal: Verbalizes/displays adequate comfort level or baseline comfort level  Outcome: Progressing     Problem: Safety - Adult  Goal: Free from fall injury  Outcome: Progressing     Problem: Discharge Planning  Goal: Discharge to home or other facility with appropriate resources  Outcome: Progressing     Problem: Chronic Conditions and Co-morbidities  Goal: Patient's chronic conditions and co-morbidity symptoms are monitored and maintained or improved  Outcome: Progressing     Problem: Pain  Goal: Takes deep breaths with improved pain control throughout the shift  Outcome: Progressing  Goal: Turns in bed with improved pain control throughout the shift  Outcome: Progressing  Goal: Walks with improved pain control throughout the shift  Outcome: Progressing  Goal: Performs ADL's with improved pain control throughout shift  Outcome: Progressing  Goal: Participates in PT with improved pain control throughout the shift  Outcome: Progressing  Goal: Free from opioid side effects throughout the shift  Outcome: Progressing  Goal: Free from acute confusion related to pain meds throughout the shift  Outcome: Progressing     Problem: Fall/Injury  Goal: Be free from injury by end of the shift  Outcome: Progressing  Goal: Verbalize understanding of personal risk factors for fall in the hospital  Outcome: Progressing  Goal: Verbalize understanding of risk factor reduction measures to prevent injury from fall in the home  Outcome: Progressing  Goal: Use assistive devices by end of the shift  Outcome: Progressing  Goal: Pace activities to prevent fatigue by end of the shift  Outcome: Progressing

## 2023-11-16 NOTE — NURSING NOTE
Rounded on pt. Pt laying in bed, eyes closed, respirations even and unlabored. Pt appears to be sleeping and in no apparent pain or distress. RLE elevated on pillow with ice pack under knee. Dressing is c/d/I, toes warm, pulse palpable below right great toe.  Uneventful night with no changes since prior assessment. Call bell and belongings are placed in reach. Telemetry leads connected and reading on monitor. NC O2 in place and connected to flowmeter. Bed alarm set. Continuing to monitor.

## 2023-11-16 NOTE — NURSING NOTE
Discharge nurse completed discharge.  Patient taken to lobby in wheelchair with belongings in stable condition.

## 2023-11-16 NOTE — DISCHARGE SUMMARY
Discharge Diagnosis  Displaced comminuted fracture of shaft of right fibula, initial encounter for closed fracture    Issues Requiring Follow-Up  Follow up with PCP and podiatry in one week    Test Results Pending At Discharge  Pending Labs       No current pending labs.            Hospital Course   Chidi Campos is a 73 y.o. male with a past medical history of hypertension and hypothyroidism who presented to the ED with complaints of ankle pain. 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 A piece fell and landed on his right foot/ankle. Patient denied other injury, did not hit his head. His friend called 911. He had obvious deformity of right ankle on arrival. Reduced in ER by provider. Patient was admitted to [facility] for further evaluation and treatment.    Patient was found to have a displaced comminuted fracture of shaft of right fibula s/p open reduction internal fixation fibula, open reduction internal fixation posterior malleolus, syndesmotic reduction by Dr. Bae on 11/15/23. Pain has been controlled with medication. Blood pressures have remained on the low side of normal. Antihypertensives were held and will remain on hold on discharge. Patient will need to follow up with his PCP to monitor his blood pressure outpatient. Podiatry recommending ASA for DVT prophylaxis on discharge. Cleared for discharge by podiatry pending PT/OT to see. Seen by PT/OT, no needs on discharge. Recommend standard walker. Order placed. Follow up with podiatry and PCP in one week.    Pertinent Physical Exam At Time of Discharge  Physical Exam  Vitals reviewed.   Constitutional:       Appearance: Normal appearance.   HENT:      Head: Normocephalic and atraumatic.   Eyes:      Extraocular Movements: Extraocular movements intact.      Conjunctiva/sclera: Conjunctivae normal.   Cardiovascular:      Rate and Rhythm: Normal rate and regular rhythm.   Pulmonary:      Effort: Pulmonary effort is normal.      Breath  sounds: Normal breath sounds. No wheezing, rhonchi or rales.   Abdominal:      General: Bowel sounds are normal.      Palpations: Abdomen is soft.      Tenderness: There is no abdominal tenderness.   Musculoskeletal:      Comments: Limited ROM to RLE   Skin:     General: Skin is warm and dry.      Comments: Surgical dressing intact   Neurological:      General: No focal deficit present.      Mental Status: He is alert and oriented to person, place, and time.         Home Medications     Medication List      START taking these medications     acetaminophen 325 mg tablet; Commonly known as: Tylenol; Take 2 tablets   (650 mg) by mouth every 6 hours if needed for mild pain (1 - 3).   aspirin 325 mg EC tablet; Take 1 tablet (325 mg) by mouth once daily.   oxyCODONE-acetaminophen 5-325 mg tablet; Commonly known as: Percocet;   Take 1 tablet by mouth every 6 hours if needed for severe pain (7 - 10)   for up to 7 days.   polyethylene glycol 17 gram packet; Commonly known as: Glycolax,   Miralax; Take 17 g by mouth once daily. Do not start before November 17, 2023.; Start taking on: November 17, 2023     CONTINUE taking these medications     levothyroxine 88 mcg tablet; Commonly known as: Synthroid, Levoxyl     STOP taking these medications     lisinopriL-hydrochlorothiazide 10-12.5 mg tablet       Outpatient Follow-Up  No future appointments.    Time spent on discharge: 35 minutes  ANTONY Davis-CNP

## 2023-11-16 NOTE — CARE PLAN
Problem: Pain - Adult  Goal: Verbalizes/displays adequate comfort level or baseline comfort level  11/16/2023 1012 by Mary Falcon RN  Outcome: Progressing  11/16/2023 1011 by Mary Falcon RN  Outcome: Progressing     Problem: Safety - Adult  Goal: Free from fall injury  11/16/2023 1012 by Mary Falcon RN  Outcome: Progressing  11/16/2023 1011 by Mary Falcon RN  Outcome: Progressing     Problem: Discharge Planning  Goal: Discharge to home or other facility with appropriate resources  11/16/2023 1012 by Mary Falcon RN  Outcome: Progressing  11/16/2023 1011 by Mary Falcon RN  Outcome: Progressing     Problem: Chronic Conditions and Co-morbidities  Goal: Patient's chronic conditions and co-morbidity symptoms are monitored and maintained or improved  11/16/2023 1012 by Mary Falcon RN  Outcome: Progressing  11/16/2023 1011 by Mary Falcon RN  Outcome: Progressing     Problem: Pain  Goal: Takes deep breaths with improved pain control throughout the shift  11/16/2023 1012 by Mary Falcon RN  Outcome: Progressing  11/16/2023 1011 by Mary Falcon RN  Outcome: Progressing  Goal: Turns in bed with improved pain control throughout the shift  11/16/2023 1012 by Mary Falcon RN  Outcome: Progressing  11/16/2023 1011 by Mary Falcon RN  Outcome: Progressing  Goal: Walks with improved pain control throughout the shift  11/16/2023 1012 by Mary Falcon RN  Outcome: Progressing  11/16/2023 1011 by Mary Falcon RN  Outcome: Progressing  Goal: Performs ADL's with improved pain control throughout shift  11/16/2023 1012 by Mary Falcon RN  Outcome: Progressing  11/16/2023 1011 by Mary Falcon RN  Outcome: Progressing  Goal: Participates in PT with improved pain control throughout the shift  11/16/2023 1012 by Mary Falcon RN  Outcome: Progressing  11/16/2023 1011 by Mary Falcon RN  Outcome: Progressing  Goal: Free from opioid side effects throughout the shift  11/16/2023 1012 by  Mary Falcon RN  Outcome: Progressing  11/16/2023 1011 by Mary Falcon RN  Outcome: Progressing  Goal: Free from acute confusion related to pain meds throughout the shift  11/16/2023 1012 by Mary Falcon RN  Outcome: Progressing  11/16/2023 1011 by Mary Falcon RN  Outcome: Progressing     Problem: Fall/Injury  Goal: Be free from injury by end of the shift  11/16/2023 1012 by Mary Falcon RN  Outcome: Progressing  11/16/2023 1011 by Mary Falcon RN  Outcome: Progressing  Goal: Verbalize understanding of personal risk factors for fall in the hospital  11/16/2023 1012 by Mary Falcon RN  Outcome: Progressing  11/16/2023 1011 by Mary Falcon RN  Outcome: Progressing  Goal: Verbalize understanding of risk factor reduction measures to prevent injury from fall in the home  11/16/2023 1012 by Mary Falcon RN  Outcome: Progressing  11/16/2023 1011 by Mary Falcon RN  Outcome: Progressing  Goal: Use assistive devices by end of the shift  11/16/2023 1012 by Mary Falcon RN  Outcome: Progressing  11/16/2023 1011 by Mary Falcon RN  Outcome: Progressing  Goal: Pace activities to prevent fatigue by end of the shift  11/16/2023 1012 by Mary Falcon RN  Outcome: Progressing  11/16/2023 1011 by Mary Falcon RN  Outcome: Progressing   The patient's goals for the shift include pain control    The clinical goals for the shift include manage pain, monitor surgical site

## 2023-11-16 NOTE — NURSING NOTE
Assumed care of patient, patient resting comfortably, denies any needs at this time.  Possible discharge today.

## 2023-11-16 NOTE — CARE PLAN
Problem: Pain - Adult  Goal: Verbalizes/displays adequate comfort level or baseline comfort level  11/16/2023 1012 by Mary Falcon RN  Outcome: Progressing  11/16/2023 1011 by Mary Falcon RN  Outcome: Progressing     Problem: Safety - Adult  Goal: Free from fall injury  11/16/2023 1012 by Mary Falcon RN  Outcome: Progressing  11/16/2023 1011 by Mary Falcon RN  Outcome: Progressing     Problem: Discharge Planning  Goal: Discharge to home or other facility with appropriate resources  11/16/2023 1012 by Mary Falcon RN  Outcome: Progressing  11/16/2023 1011 by Mary Falcon RN  Outcome: Progressing     Problem: Chronic Conditions and Co-morbidities  Goal: Patient's chronic conditions and co-morbidity symptoms are monitored and maintained or improved  11/16/2023 1012 by Mary Falcon RN  Outcome: Progressing  11/16/2023 1011 by Mary Falcon RN  Outcome: Progressing     Problem: Pain  Goal: Takes deep breaths with improved pain control throughout the shift  11/16/2023 1012 by Mary Falcon RN  Outcome: Progressing  11/16/2023 1011 by Mary Falcon RN  Outcome: Progressing  Goal: Turns in bed with improved pain control throughout the shift  11/16/2023 1012 by Mary Falcon RN  Outcome: Progressing  11/16/2023 1011 by Mary Falcon RN  Outcome: Progressing  Goal: Walks with improved pain control throughout the shift  11/16/2023 1012 by Mary Falcon RN  Outcome: Progressing  11/16/2023 1011 by Mary Falcon RN  Outcome: Progressing  Goal: Performs ADL's with improved pain control throughout shift  11/16/2023 1012 by Mary Falcon RN  Outcome: Progressing  11/16/2023 1011 by Mary Falcon RN  Outcome: Progressing  Goal: Participates in PT with improved pain control throughout the shift  11/16/2023 1012 by Mary Falcon RN  Outcome: Progressing  11/16/2023 1011 by Mary Falcon RN  Outcome: Progressing  Goal: Free from opioid side effects throughout the shift  11/16/2023 1012 by  Mary Falcon RN  Outcome: Progressing  11/16/2023 1011 by Mary Falcon RN  Outcome: Progressing  Goal: Free from acute confusion related to pain meds throughout the shift  11/16/2023 1012 by Mary Falcon RN  Outcome: Progressing  11/16/2023 1011 by Mary Falcon RN  Outcome: Progressing     Problem: Fall/Injury  Goal: Be free from injury by end of the shift  11/16/2023 1012 by Mary Falcon RN  Outcome: Progressing  11/16/2023 1011 by Mary Falcon RN  Outcome: Progressing  Goal: Verbalize understanding of personal risk factors for fall in the hospital  11/16/2023 1012 by Mary Falcon RN  Outcome: Progressing  11/16/2023 1011 by Mary Falcon RN  Outcome: Progressing  Goal: Verbalize understanding of risk factor reduction measures to prevent injury from fall in the home  11/16/2023 1012 by Mary Falcon RN  Outcome: Progressing  11/16/2023 1011 by Mary Falcon RN  Outcome: Progressing  Goal: Use assistive devices by end of the shift  11/16/2023 1012 by Mary Falcon RN  Outcome: Progressing  11/16/2023 1011 by Mary Falcon RN  Outcome: Progressing  Goal: Pace activities to prevent fatigue by end of the shift  11/16/2023 1012 by Mary Falcon RN  Outcome: Progressing  11/16/2023 1011 by Mary Falcon RN  Outcome: Progressing     Problem: Pain - Adult  Goal: Verbalizes/displays adequate comfort level or baseline comfort level  11/16/2023 1012 by Mary Falcon RN  Outcome: Progressing  11/16/2023 1011 by Mary Falcon RN  Outcome: Progressing     Problem: Safety - Adult  Goal: Free from fall injury  11/16/2023 1012 by Mary Falcon RN  Outcome: Progressing  11/16/2023 1011 by Mary Falcon RN  Outcome: Progressing     Problem: Discharge Planning  Goal: Discharge to home or other facility with appropriate resources  11/16/2023 1012 by Mary Falcon RN  Outcome: Progressing  11/16/2023 1011 by Mary Falcon RN  Outcome: Progressing     Problem: Chronic Conditions and  Co-morbidities  Goal: Patient's chronic conditions and co-morbidity symptoms are monitored and maintained or improved  11/16/2023 1012 by Mary Falcon RN  Outcome: Progressing  11/16/2023 1011 by Mary Falcon RN  Outcome: Progressing     Problem: Pain  Goal: Takes deep breaths with improved pain control throughout the shift  11/16/2023 1012 by Mary Falcon RN  Outcome: Progressing  11/16/2023 1011 by Mary Falcon RN  Outcome: Progressing  Goal: Turns in bed with improved pain control throughout the shift  11/16/2023 1012 by Mary Falcon RN  Outcome: Progressing  11/16/2023 1011 by Mary Falcon RN  Outcome: Progressing  Goal: Walks with improved pain control throughout the shift  11/16/2023 1012 by Mary Falcon RN  Outcome: Progressing  11/16/2023 1011 by Mary Falcon RN  Outcome: Progressing  Goal: Performs ADL's with improved pain control throughout shift  11/16/2023 1012 by Mary Falcon RN  Outcome: Progressing  11/16/2023 1011 by Mary Falcon RN  Outcome: Progressing  Goal: Participates in PT with improved pain control throughout the shift  11/16/2023 1012 by Mary Falcon RN  Outcome: Progressing  11/16/2023 1011 by Mary Falcon RN  Outcome: Progressing  Goal: Free from opioid side effects throughout the shift  11/16/2023 1012 by Mary Falcon RN  Outcome: Progressing  11/16/2023 1011 by Mary Falcon RN  Outcome: Progressing  Goal: Free from acute confusion related to pain meds throughout the shift  11/16/2023 1012 by Mary Falcon RN  Outcome: Progressing  11/16/2023 1011 by Mary Falcon RN  Outcome: Progressing     Problem: Fall/Injury  Goal: Be free from injury by end of the shift  11/16/2023 1012 by Mary Falcon RN  Outcome: Progressing  11/16/2023 1011 by Mary Falcon RN  Outcome: Progressing  Goal: Verbalize understanding of personal risk factors for fall in the hospital  11/16/2023 1012 by Mary Falcon RN  Outcome: Progressing  11/16/2023 1011 by  Mary Falcon RN  Outcome: Progressing  Goal: Verbalize understanding of risk factor reduction measures to prevent injury from fall in the home  11/16/2023 1012 by Mary Falcon RN  Outcome: Progressing  11/16/2023 1011 by Mary Falcon RN  Outcome: Progressing  Goal: Use assistive devices by end of the shift  11/16/2023 1012 by Mary Falcon RN  Outcome: Progressing  11/16/2023 1011 by Mary Falcon RN  Outcome: Progressing  Goal: Pace activities to prevent fatigue by end of the shift  11/16/2023 1012 by Mary Falcon RN  Outcome: Progressing  11/16/2023 1011 by Mary Falcon RN  Outcome: Progressing     Problem: Skin  Goal: Decreased wound size/increased tissue granulation at next dressing change  Flowsheets (Taken 11/16/2023 1013)  Decreased wound size/increased tissue granulation at next dressing change:   Promote sleep for wound healing   Utilize specialty bed per algorithm   Protective dressings over bony prominences   The patient's goals for the shift include pain control    The clinical goals for the shift include manage pain, monitor surgical site

## 2023-11-16 NOTE — PROGRESS NOTES
Chidi Campos is a 73 y.o. male on day 2 of admission presenting with Displaced comminuted fracture of shaft of right fibula, initial encounter for closed fracture.      Subjective   Patient seen and examined. Awake/alert/oriented. Denies chest pain, shortness of breath, fevers, chills, nausea, or vomiting. No abdominal discomfort. Reports mild discomfort to surgical site.          Objective     Last Recorded Vitals  /65 (BP Location: Left arm, Patient Position: Lying)   Pulse 85   Temp 36.4 °C (97.5 °F) (Temporal)   Resp 16   Wt 76.2 kg (167 lb 15.9 oz)   SpO2 98%   Intake/Output last 3 Shifts:    Intake/Output Summary (Last 24 hours) at 11/16/2023 1039  Last data filed at 11/16/2023 0811  Gross per 24 hour   Intake 1936 ml   Output 2395 ml   Net -459 ml       Admission Weight  Weight: 74.8 kg (165 lb) (11/14/23 1148)    Daily Weight  11/15/23 : 76.2 kg (167 lb 15.9 oz)    Image Results  FL less than 1 hour  These images are not reportable by radiology and will not be interpreted   by  Radiologists.      Physical Exam  Vitals reviewed.   Constitutional:       Appearance: Normal appearance.   HENT:      Head: Normocephalic and atraumatic.   Eyes:      Extraocular Movements: Extraocular movements intact.      Conjunctiva/sclera: Conjunctivae normal.   Cardiovascular:      Rate and Rhythm: Normal rate and regular rhythm.   Pulmonary:      Effort: Pulmonary effort is normal.      Breath sounds: Normal breath sounds. No wheezing, rhonchi or rales.   Abdominal:      General: Bowel sounds are normal.      Palpations: Abdomen is soft.      Tenderness: There is no abdominal tenderness.   Musculoskeletal:      Comments: Limited ROM to RLE   Skin:     General: Skin is warm and dry.      Comments: Surgical dressing intact.    Neurological:      General: No focal deficit present.      Mental Status: He is alert and oriented to person, place, and time.         Relevant Results  Lab Results   Component Value Date     GLUCOSE 115 (H) 11/15/2023    CALCIUM 8.9 11/15/2023     11/15/2023    K 3.5 11/15/2023    CO2 24 11/15/2023     11/15/2023    BUN 10 11/15/2023    CREATININE 1.10 11/15/2023     Lab Results   Component Value Date    WBC 7.1 11/15/2023    HGB 12.4 (L) 11/15/2023    HCT 35.9 (L) 11/15/2023    MCV 95 11/15/2023     11/15/2023     FL less than 1 hour    Result Date: 11/15/2023  These images are not reportable by radiology and will not be interpreted by  Radiologists.    CT ankle right wo IV contrast    Result Date: 11/14/2023  Interpreted By:  Shannan Garcia, STUDY: CT ANKLE RIGHT WO IV CONTRAST; ;  11/14/2023 3:44 pm   INDICATION: Signs/Symptoms:pre op planning. Pain   COMPARISON: None.   ACCESSION NUMBER(S): [id number]   ORDERING CLINICIAN: PRICILLA AGUIRRE   TECHNIQUE: Serial axial CT images obtained of the right ankle. Images reformatted in the coronal and sagittal projection.   All CT examinations are performed with 1 or more of the following dose reduction techniques: Automated exposure control, adjustment of mA and/or kv according to patient's size, or use of iterative reconstruction techniques.   FINDINGS: Minimally displaced transverse fracture demonstrated through the distal fibular diaphysis with slight lateral oblique configuration of fracture and subtle comminution with a single fragment along the posterior margin of the fracture line. There is lateral displacement at the level of the fracture line measuring 3 mm. The fibular diaphysis extending distally is unremarkable. A few subtle cortical avulsion fragments are demonstrated from the anterior margin of the lateral malleolus at the attachment of the anterior talofibular ligament. Also, a few cortical fragments demonstrated about the distribution of the anterior tibiofibular ligament.   Medial malleolus demonstrates no significant fracture. A few cortical fragments are demonstrated about the distribution of the deep deltoid  ligament with mild widening and lateral subluxation of the talus. There is a osteochondral fracture through the posterior tibial plafond with interposed ossific fragments demonstrated. This ossific fragment measures 0.9 x 2.6 x 2.7 cm in the AP, transverse and craniocaudal dimension respectively. There are a few cortical fragments interposed between this fragment and the corresponding tibia with largest fragment measuring a proximally 9 mm. Subtle comminution at the fracture line with several bodies noted. There is posterior displacement of the osteochondral fragment at the level of the ankle joint line measuring 10 mm.   A few cortical fragments are demonstrated within the anterior ankle joint recess.   Talus demonstrates no evidence for fracture. Remainder of the hindfoot is unremarkable. Midfoot and intertarsal articulations are unremarkable. Visualized portions metatarsal shafts are unremarkable. There is moderate subcutaneous edema about the ankle and dorsum of the foot.           1. Minimally incongruent ankle mortise with mild widening of the medial clear space measuring 6 mm. There is a displaced fracture through the posterior malleolus of the tibia with osteochondral fragment measuring 0.9 x 2.6 x 2.7 cm. There is posterior displacement of the fragment measuring 10 mm at the level of the ankle joint with several interposed ossific fragments between the osteochondral fragment in the donor site.   2. Minimally comminuted fracture through the distal fibular diaphysis with minimal lateral displacement of 3 mm.     MACRO: None   Signed by: Shannan Garcia 11/14/2023 4:52 PM Dictation workstation:   [id number]    XR ankle right 3+ views    Result Date: 11/14/2023  Interpreted By:  Thomas Bagley, STUDY: XR ANKLE RIGHT 3+ VIEWS; 11/14/2023 2:02 pm   INDICATION: Signs/Symptoms:post reduction;   COMPARISON: Earlier on 11/14/2023   ACCESSION NUMBER(S): [id number]   ORDERING CLINICIAN: PRICILLA AGUIRRE    TECHNIQUE: Views: AP, Lateral, Oblique   FINDINGS: Interval reduction of right ankle dislocation. The tibiotalar joint appears intact. Mild widening of the medial clear space. There is a posterior malleolar fracture which is not well-visualized on the given views. The medial malleolus appears intact. Overlying cast.       Interval reduction of right ankle dislocation which is now located. There is mild widening of the medial clear space. Mildly displaced fracture through the mid-distal diaphysis of the right fibula, in markedly improved anatomic position. Posterior malleolar fracture of the tibia not well-visualized on the given view.   Signed by: Thomas Bagley 11/14/2023 3:04 PM Dictation workstation:   [id number]    XR ankle right 3+ views    Result Date: 11/14/2023  Interpreted By:  Thomas Bagley, STUDY: XR ANKLE RIGHT 3+ VIEWS; 11/14/2023 12:42 pm   INDICATION: Signs/Symptoms:injury;   COMPARISON: None available.   ACCESSION NUMBER(S): [id number]   ORDERING CLINICIAN: BARBARA GRUBER   TECHNIQUE: Views: AP, Lateral, Oblique   FINDINGS: Complete dislocation of right ankle tibiotalar joint. There is displaced and angulated fracture through the distal diaphysis of the right fibula. The medial malleolus is not well visualized on the given views neither is the posterior malleolus of the tibia although a fracture fragment is seen on the AP view concerning for a trimalleolar fracture. Cross-sectional imaging can be obtained as deemed clinically warranted.       Complete dislocation of the right ankle, tibiotalar joint. Displaced and angulated fracture through the distal diaphysis of the right fibula. Probable trimalleolar fracture although limited on plain film evaluation, consider cross-sectional imaging as deemed clinically warranted.   Signed by: Thomas Bagley 11/14/2023 1:43 PM Dictation workstation:   [id number]            Assessment/Plan      Principal Problem:    Displaced comminuted fracture of shaft  of right fibula, initial encounter for closed fracture  Active Problems:    Closed trimalleolar fracture of right ankle    Displaced comminuted fracture of shaft of right fibula  S/p open reduction internal fixation fibula, open reduction internal fixation posterior malleolus, syndesmotic reduction by Dr. Bae on 11/15/23  Pain management  Bowel regimen  Incentive spirometer  PT/OT  Podiatry managing, appreciate recommendations, cleared for discharge    Hypertension  Blood pressure remains on the lower side of normal  Continue to hold antihypertensives for now    Hypothyroidism  Resume home levothyroxine    Plan  Pain management/bowel regimen  Encourage incentive spirometer  PT/OT to see  Podiatry following  DVT prophylaxis: Heparin, change to ASA 325mg on discharge  Stable for discharge pending PT/OT to see for discharge recommendations                Cary Mcneal, APRN-CNP

## 2023-11-20 ENCOUNTER — TELEPHONE (OUTPATIENT)
Dept: PRIMARY CARE | Facility: CLINIC | Age: 73
End: 2023-11-20
Payer: MEDICARE

## 2023-11-20 NOTE — TELEPHONE ENCOUNTER
Pt wife Margarita called and states pt was in hospital for broken ankle and  was taken off bp meds because bp was too low. Has not been on for a week. Pt was told to call pcp.

## 2023-11-28 ENCOUNTER — OFFICE VISIT (OUTPATIENT)
Dept: PRIMARY CARE | Facility: CLINIC | Age: 73
End: 2023-11-28
Payer: MEDICARE

## 2023-11-28 VITALS
HEART RATE: 94 BPM | BODY MASS INDEX: 25.74 KG/M2 | RESPIRATION RATE: 18 BRPM | TEMPERATURE: 98.2 F | DIASTOLIC BLOOD PRESSURE: 82 MMHG | SYSTOLIC BLOOD PRESSURE: 142 MMHG | WEIGHT: 164 LBS | OXYGEN SATURATION: 98 %

## 2023-11-28 DIAGNOSIS — I10 ESSENTIAL HYPERTENSION: Primary | ICD-10-CM

## 2023-11-28 PROBLEM — N40.1 LOWER URINARY TRACT SYMPTOMS DUE TO BENIGN PROSTATIC HYPERPLASIA: Status: ACTIVE | Noted: 2023-11-28

## 2023-11-28 PROBLEM — R97.20 ELEVATED PSA: Status: ACTIVE | Noted: 2023-11-28

## 2023-11-28 PROCEDURE — 1111F DSCHRG MED/CURRENT MED MERGE: CPT | Performed by: REGISTERED NURSE

## 2023-11-28 PROCEDURE — 3077F SYST BP >= 140 MM HG: CPT | Performed by: REGISTERED NURSE

## 2023-11-28 PROCEDURE — 1036F TOBACCO NON-USER: CPT | Performed by: REGISTERED NURSE

## 2023-11-28 PROCEDURE — 1159F MED LIST DOCD IN RCRD: CPT | Performed by: REGISTERED NURSE

## 2023-11-28 PROCEDURE — 1160F RVW MEDS BY RX/DR IN RCRD: CPT | Performed by: REGISTERED NURSE

## 2023-11-28 PROCEDURE — 99213 OFFICE O/P EST LOW 20 MIN: CPT | Performed by: REGISTERED NURSE

## 2023-11-28 PROCEDURE — 1126F AMNT PAIN NOTED NONE PRSNT: CPT | Performed by: REGISTERED NURSE

## 2023-11-28 PROCEDURE — 3079F DIAST BP 80-89 MM HG: CPT | Performed by: REGISTERED NURSE

## 2023-11-28 RX ORDER — LISINOPRIL 10 MG/1
10 TABLET ORAL EVERY 24 HOURS
COMMUNITY
End: 2024-02-19 | Stop reason: SDUPTHER

## 2023-11-28 ASSESSMENT — ENCOUNTER SYMPTOMS
OCCASIONAL FEELINGS OF UNSTEADINESS: 0
DEPRESSION: 0
LOSS OF SENSATION IN FEET: 0

## 2023-11-28 ASSESSMENT — PAIN SCALES - GENERAL: PAINLEVEL: 0-NO PAIN

## 2023-11-28 NOTE — PROGRESS NOTES
Subjective   Patient ID: Chidi Campos is a 73 y.o. male who presents for Follow-up (2 weeks ago pt rolled log over foot and broke ankle. dr rees did surgery. Pt was taken off bp meds for surgery and needs to restart medication.  Pt here with mariangel his wife).    Pt here for follow up after breaking ankle         Review of Systems   All other systems reviewed and are negative.      Objective   BP (!) 160/100   Pulse 94   Temp 36.8 °C (98.2 °F)   Resp 18   Wt 74.4 kg (164 lb)   SpO2 98%   BMI 25.74 kg/m²     Physical Exam  Vitals reviewed.   Constitutional:       Appearance: Normal appearance.   Neurological:      Mental Status: He is alert.   Psychiatric:         Mood and Affect: Mood normal.         Behavior: Behavior normal.         Assessment/Plan   Problem List Items Addressed This Visit    None

## 2024-02-07 DIAGNOSIS — E03.9 HYPOTHYROIDISM, UNSPECIFIED: ICD-10-CM

## 2024-02-07 RX ORDER — LEVOTHYROXINE SODIUM 88 UG/1
88 TABLET ORAL
Qty: 90 TABLET | Refills: 1 | Status: SHIPPED | OUTPATIENT
Start: 2024-02-07 | End: 2024-08-05

## 2024-02-19 DIAGNOSIS — I10 ESSENTIAL HYPERTENSION: ICD-10-CM

## 2024-02-19 RX ORDER — LISINOPRIL 10 MG/1
5 TABLET ORAL EVERY 24 HOURS
Qty: 45 TABLET | Refills: 1 | Status: SHIPPED | OUTPATIENT
Start: 2024-02-19 | End: 2024-04-05 | Stop reason: SDUPTHER

## 2024-04-05 DIAGNOSIS — I10 ESSENTIAL HYPERTENSION: ICD-10-CM

## 2024-04-05 RX ORDER — LISINOPRIL 10 MG/1
5 TABLET ORAL EVERY 24 HOURS
Qty: 45 TABLET | Refills: 1 | Status: SHIPPED | OUTPATIENT
Start: 2024-04-05 | End: 2024-04-09 | Stop reason: SDUPTHER

## 2024-04-05 NOTE — TELEPHONE ENCOUNTER
Pt wife called and states pt was taking 1/2 tab of lisinopril (which was changed a few months ago) but states pt was having headaches and went back to taking a full tablet. Pt has ran out of meds and will need refill. Did you want pt on 1/2 tab or full? Please send rx of dose you want pt on

## 2024-04-08 ENCOUNTER — PATIENT MESSAGE (OUTPATIENT)
Dept: PRIMARY CARE | Facility: CLINIC | Age: 74
End: 2024-04-08
Payer: MEDICARE

## 2024-04-09 ENCOUNTER — TELEPHONE (OUTPATIENT)
Dept: PRIMARY CARE | Facility: CLINIC | Age: 74
End: 2024-04-09
Payer: MEDICARE

## 2024-04-09 DIAGNOSIS — I10 ESSENTIAL HYPERTENSION: ICD-10-CM

## 2024-04-10 ENCOUNTER — OFFICE VISIT (OUTPATIENT)
Dept: PRIMARY CARE | Facility: CLINIC | Age: 74
End: 2024-04-10
Payer: MEDICARE

## 2024-04-10 VITALS
DIASTOLIC BLOOD PRESSURE: 82 MMHG | HEART RATE: 80 BPM | RESPIRATION RATE: 18 BRPM | WEIGHT: 172.2 LBS | BODY MASS INDEX: 27.03 KG/M2 | SYSTOLIC BLOOD PRESSURE: 138 MMHG | TEMPERATURE: 97.9 F | OXYGEN SATURATION: 97 % | HEIGHT: 67 IN

## 2024-04-10 DIAGNOSIS — I10 ESSENTIAL HYPERTENSION: Primary | ICD-10-CM

## 2024-04-10 PROCEDURE — 1126F AMNT PAIN NOTED NONE PRSNT: CPT | Performed by: NURSE PRACTITIONER

## 2024-04-10 PROCEDURE — 1159F MED LIST DOCD IN RCRD: CPT | Performed by: NURSE PRACTITIONER

## 2024-04-10 PROCEDURE — 3079F DIAST BP 80-89 MM HG: CPT | Performed by: NURSE PRACTITIONER

## 2024-04-10 PROCEDURE — 99213 OFFICE O/P EST LOW 20 MIN: CPT | Performed by: NURSE PRACTITIONER

## 2024-04-10 PROCEDURE — 1036F TOBACCO NON-USER: CPT | Performed by: NURSE PRACTITIONER

## 2024-04-10 PROCEDURE — 3075F SYST BP GE 130 - 139MM HG: CPT | Performed by: NURSE PRACTITIONER

## 2024-04-10 RX ORDER — LISINOPRIL 10 MG/1
10 TABLET ORAL DAILY
Qty: 90 TABLET | Refills: 1 | Status: SHIPPED | OUTPATIENT
Start: 2024-04-10 | End: 2024-05-13

## 2024-04-10 RX ORDER — LISINOPRIL 10 MG/1
10 TABLET ORAL EVERY 24 HOURS
Qty: 45 TABLET | Refills: 1 | Status: SHIPPED | OUTPATIENT
Start: 2024-04-10 | End: 2024-04-10

## 2024-04-10 ASSESSMENT — ENCOUNTER SYMPTOMS
HEADACHES: 1
ARTHRALGIAS: 1
HYPERTENSION: 1

## 2024-04-10 ASSESSMENT — LIFESTYLE VARIABLES
HOW OFTEN DO YOU HAVE SIX OR MORE DRINKS ON ONE OCCASION: NEVER
HOW MANY STANDARD DRINKS CONTAINING ALCOHOL DO YOU HAVE ON A TYPICAL DAY: PATIENT DOES NOT DRINK
SKIP TO QUESTIONS 9-10: 1
HOW OFTEN DO YOU HAVE A DRINK CONTAINING ALCOHOL: NEVER
AUDIT-C TOTAL SCORE: 0

## 2024-04-10 ASSESSMENT — PATIENT HEALTH QUESTIONNAIRE - PHQ9
2. FEELING DOWN, DEPRESSED OR HOPELESS: NOT AT ALL
SUM OF ALL RESPONSES TO PHQ9 QUESTIONS 1 AND 2: 0
1. LITTLE INTEREST OR PLEASURE IN DOING THINGS: NOT AT ALL

## 2024-04-10 ASSESSMENT — PAIN SCALES - GENERAL: PAINLEVEL: 0-NO PAIN

## 2024-04-10 NOTE — PROGRESS NOTES
"Subjective   Patient ID: Chidi Campos is a 73 y.o. male who presents for Hypertension (PT IS HERE FOR A BP CHECK ).    Broke ankle was was in [facility] therpy . Seen told to  stop meds saw us told to take half a tablet. Checking bp athome  bp higher    Hypertension  This is a chronic problem. The current episode started more than 1 year ago. The problem is unchanged. Associated symptoms include headaches. There are no associated agents to hypertension. There are no known risk factors for coronary artery disease.        Review of Systems   Musculoskeletal:  Positive for arthralgias.   Neurological:  Positive for headaches.       Objective   /82   Pulse 80   Temp 36.6 °C (97.9 °F)   Resp 18   Ht 1.702 m (5' 7\")   Wt 78.1 kg (172 lb 3.2 oz)   SpO2 97%   BMI 26.97 kg/m²     Physical Exam  Constitutional:       General: He is not in acute distress.     Appearance: Normal appearance.   Cardiovascular:      Rate and Rhythm: Normal rate and regular rhythm.      Heart sounds: No murmur heard.  Pulmonary:      Breath sounds: Normal breath sounds. No wheezing.   Neurological:      Mental Status: He is alert.       Assessment/Plan   Problem List Items Addressed This Visit             ICD-10-CM    Essential hypertension - Primary I10          "

## 2024-05-13 DIAGNOSIS — I10 ESSENTIAL HYPERTENSION: ICD-10-CM

## 2024-05-13 RX ORDER — LISINOPRIL 10 MG/1
TABLET ORAL
Qty: 45 TABLET | Refills: 1 | Status: SHIPPED | OUTPATIENT
Start: 2024-05-13

## 2024-05-28 ENCOUNTER — APPOINTMENT (OUTPATIENT)
Dept: RADIOLOGY | Facility: HOSPITAL | Age: 74
End: 2024-05-28
Payer: MEDICARE

## 2024-09-16 ENCOUNTER — OFFICE VISIT (OUTPATIENT)
Dept: PRIMARY CARE | Facility: CLINIC | Age: 74
End: 2024-09-16
Payer: MEDICARE

## 2024-09-16 VITALS
HEIGHT: 67 IN | OXYGEN SATURATION: 99 % | BODY MASS INDEX: 26.06 KG/M2 | SYSTOLIC BLOOD PRESSURE: 138 MMHG | RESPIRATION RATE: 18 BRPM | DIASTOLIC BLOOD PRESSURE: 78 MMHG | HEART RATE: 70 BPM | WEIGHT: 166 LBS | TEMPERATURE: 97.3 F

## 2024-09-16 DIAGNOSIS — E55.9 VITAMIN D DEFICIENCY: ICD-10-CM

## 2024-09-16 DIAGNOSIS — Z12.83 SCREENING FOR SKIN CANCER: ICD-10-CM

## 2024-09-16 DIAGNOSIS — Z00.00 ROUTINE MEDICAL EXAM: Primary | ICD-10-CM

## 2024-09-16 DIAGNOSIS — Z13.29 SCREENING FOR THYROID DISORDER: ICD-10-CM

## 2024-09-16 DIAGNOSIS — E03.9 HYPOTHYROIDISM, UNSPECIFIED: ICD-10-CM

## 2024-09-16 DIAGNOSIS — I10 ESSENTIAL HYPERTENSION: ICD-10-CM

## 2024-09-16 LAB
25(OH)D3 SERPL-MCNC: 41 NG/ML (ref 31–100)
ALBUMIN SERPL-MCNC: 4.7 G/DL (ref 3.5–5)
ALP BLD-CCNC: 64 U/L (ref 35–125)
ALT SERPL-CCNC: 16 U/L (ref 5–40)
ANION GAP SERPL CALC-SCNC: 14 MMOL/L
AST SERPL-CCNC: 17 U/L (ref 5–40)
BASOPHILS # BLD AUTO: 0.05 X10*3/UL (ref 0–0.1)
BASOPHILS NFR BLD AUTO: 0.7 %
BILIRUB SERPL-MCNC: 2.7 MG/DL (ref 0.1–1.2)
BUN SERPL-MCNC: 21 MG/DL (ref 8–25)
CALCIUM SERPL-MCNC: 10.2 MG/DL (ref 8.5–10.4)
CHLORIDE SERPL-SCNC: 104 MMOL/L (ref 97–107)
CHOLEST SERPL-MCNC: 185 MG/DL (ref 133–200)
CHOLEST/HDLC SERPL: 4.1 {RATIO}
CO2 SERPL-SCNC: 22 MMOL/L (ref 24–31)
CREAT SERPL-MCNC: 1.2 MG/DL (ref 0.4–1.6)
EGFRCR SERPLBLD CKD-EPI 2021: 64 ML/MIN/1.73M*2
EOSINOPHIL # BLD AUTO: 0.07 X10*3/UL (ref 0–0.4)
EOSINOPHIL NFR BLD AUTO: 0.9 %
ERYTHROCYTE [DISTWIDTH] IN BLOOD BY AUTOMATED COUNT: 11.9 % (ref 11.5–14.5)
GLUCOSE SERPL-MCNC: 106 MG/DL (ref 65–99)
HCT VFR BLD AUTO: 45.6 % (ref 41–52)
HDLC SERPL-MCNC: 45 MG/DL
HGB BLD-MCNC: 15.4 G/DL (ref 13.5–17.5)
IMM GRANULOCYTES # BLD AUTO: 0.03 X10*3/UL (ref 0–0.5)
IMM GRANULOCYTES NFR BLD AUTO: 0.4 % (ref 0–0.9)
LDLC SERPL CALC-MCNC: 124 MG/DL (ref 65–130)
LYMPHOCYTES # BLD AUTO: 0.77 X10*3/UL (ref 0.8–3)
LYMPHOCYTES NFR BLD AUTO: 10.4 %
MCH RBC QN AUTO: 32.8 PG (ref 26–34)
MCHC RBC AUTO-ENTMCNC: 33.8 G/DL (ref 32–36)
MCV RBC AUTO: 97 FL (ref 80–100)
MONOCYTES # BLD AUTO: 0.48 X10*3/UL (ref 0.05–0.8)
MONOCYTES NFR BLD AUTO: 6.5 %
NEUTROPHILS # BLD AUTO: 5.98 X10*3/UL (ref 1.6–5.5)
NEUTROPHILS NFR BLD AUTO: 81.1 %
NRBC BLD-RTO: 0 /100 WBCS (ref 0–0)
PLATELET # BLD AUTO: 374 X10*3/UL (ref 150–450)
POTASSIUM SERPL-SCNC: 4.3 MMOL/L (ref 3.4–5.1)
PROT SERPL-MCNC: 7.4 G/DL (ref 5.9–7.9)
RBC # BLD AUTO: 4.7 X10*6/UL (ref 4.5–5.9)
SODIUM SERPL-SCNC: 140 MMOL/L (ref 133–145)
TRIGL SERPL-MCNC: 79 MG/DL (ref 40–150)
TSH SERPL DL<=0.05 MIU/L-ACNC: 1.16 MIU/L (ref 0.27–4.2)
WBC # BLD AUTO: 7.4 X10*3/UL (ref 4.4–11.3)

## 2024-09-16 PROCEDURE — 99212 OFFICE O/P EST SF 10 MIN: CPT | Performed by: NURSE PRACTITIONER

## 2024-09-16 PROCEDURE — 3075F SYST BP GE 130 - 139MM HG: CPT | Performed by: NURSE PRACTITIONER

## 2024-09-16 PROCEDURE — 1126F AMNT PAIN NOTED NONE PRSNT: CPT | Performed by: NURSE PRACTITIONER

## 2024-09-16 PROCEDURE — 36415 COLL VENOUS BLD VENIPUNCTURE: CPT | Performed by: NURSE PRACTITIONER

## 2024-09-16 PROCEDURE — 84443 ASSAY THYROID STIM HORMONE: CPT | Performed by: NURSE PRACTITIONER

## 2024-09-16 PROCEDURE — 1123F ACP DISCUSS/DSCN MKR DOCD: CPT | Performed by: NURSE PRACTITIONER

## 2024-09-16 PROCEDURE — 80061 LIPID PANEL: CPT | Performed by: NURSE PRACTITIONER

## 2024-09-16 PROCEDURE — 1158F ADVNC CARE PLAN TLK DOCD: CPT | Performed by: NURSE PRACTITIONER

## 2024-09-16 PROCEDURE — 1170F FXNL STATUS ASSESSED: CPT | Performed by: NURSE PRACTITIONER

## 2024-09-16 PROCEDURE — 1036F TOBACCO NON-USER: CPT | Performed by: NURSE PRACTITIONER

## 2024-09-16 PROCEDURE — 99215 OFFICE O/P EST HI 40 MIN: CPT | Performed by: NURSE PRACTITIONER

## 2024-09-16 PROCEDURE — 82306 VITAMIN D 25 HYDROXY: CPT | Performed by: NURSE PRACTITIONER

## 2024-09-16 PROCEDURE — 1159F MED LIST DOCD IN RCRD: CPT | Performed by: NURSE PRACTITIONER

## 2024-09-16 PROCEDURE — G0439 PPPS, SUBSEQ VISIT: HCPCS | Performed by: NURSE PRACTITIONER

## 2024-09-16 PROCEDURE — 85025 COMPLETE CBC W/AUTO DIFF WBC: CPT | Performed by: NURSE PRACTITIONER

## 2024-09-16 PROCEDURE — 3008F BODY MASS INDEX DOCD: CPT | Performed by: NURSE PRACTITIONER

## 2024-09-16 PROCEDURE — 80053 COMPREHEN METABOLIC PANEL: CPT | Performed by: NURSE PRACTITIONER

## 2024-09-16 PROCEDURE — 3078F DIAST BP <80 MM HG: CPT | Performed by: NURSE PRACTITIONER

## 2024-09-16 RX ORDER — LISINOPRIL 10 MG/1
10 TABLET ORAL DAILY
Qty: 90 TABLET | Refills: 2 | Status: SHIPPED | OUTPATIENT
Start: 2024-09-16

## 2024-09-16 ASSESSMENT — ACTIVITIES OF DAILY LIVING (ADL)
DOING_HOUSEWORK: INDEPENDENT
TAKING_MEDICATION: INDEPENDENT
DRESSING: INDEPENDENT
BATHING: INDEPENDENT
GROCERY_SHOPPING: INDEPENDENT
MANAGING_FINANCES: INDEPENDENT

## 2024-09-16 ASSESSMENT — PAIN SCALES - GENERAL: PAINLEVEL: 0-NO PAIN

## 2024-09-16 ASSESSMENT — LIFESTYLE VARIABLES: HOW MANY STANDARD DRINKS CONTAINING ALCOHOL DO YOU HAVE ON A TYPICAL DAY: PATIENT DOES NOT DRINK

## 2024-09-16 ASSESSMENT — PATIENT HEALTH QUESTIONNAIRE - PHQ9
1. LITTLE INTEREST OR PLEASURE IN DOING THINGS: NOT AT ALL
2. FEELING DOWN, DEPRESSED OR HOPELESS: NOT AT ALL
SUM OF ALL RESPONSES TO PHQ9 QUESTIONS 1 AND 2: 0

## 2024-09-16 NOTE — PROGRESS NOTES
"Subjective   Patient ID: Chidi Campos is a 73 y.o. male who presents for Medicare Annual Wellness Visit Subsequent (Pt here for medicare annual exam).    Here for a well visit,sees dr barfield for urology. Reviewed chart  was seen by neuro and referred to neuro for further work up of a sm aneurysm , at [facility], did not have it  because broke his ankle, , discussed was referred last year by dr carreon,         Review of Systems   All other systems reviewed and are negative.      Objective   /78   Pulse 70   Temp 36.3 °C (97.3 °F)   Resp 18   Ht 1.702 m (5' 7\")   Wt 75.3 kg (166 lb)   SpO2 99%   BMI 26.00 kg/m²     Physical Exam  Vitals and nursing note reviewed.   Constitutional:       General: He is not in acute distress.  HENT:      Right Ear: Tympanic membrane and ear canal normal.      Left Ear: Tympanic membrane and ear canal normal.      Nose: Nose normal. No rhinorrhea.      Mouth/Throat:      Pharynx: Oropharynx is clear. No oropharyngeal exudate or posterior oropharyngeal erythema.      Comments: Dentition wnl  Eyes:      Extraocular Movements: Extraocular movements intact.      Conjunctiva/sclera: Conjunctivae normal.      Pupils: Pupils are equal, round, and reactive to light.   Neck:      Vascular: No carotid bruit.   Cardiovascular:      Rate and Rhythm: Normal rate and regular rhythm.      Heart sounds: Normal heart sounds. No murmur heard.  Pulmonary:      Breath sounds: Normal breath sounds. No wheezing or rhonchi.   Abdominal:      General: Bowel sounds are normal. There is no distension.      Palpations: Abdomen is soft. There is no mass.      Tenderness: There is no abdominal tenderness. There is no guarding or rebound.      Hernia: No hernia is present.   Musculoskeletal:         General: No swelling or tenderness. Normal range of motion.      Cervical back: Normal range of motion and neck supple.   Lymphadenopathy:      Cervical: No cervical adenopathy.   Skin:     General: Skin is " warm.      Findings: No rash.   Neurological:      General: No focal deficit present.      Mental Status: He is alert.         Assessment/Plan   Problem List Items Addressed This Visit             ICD-10-CM    Essential hypertension I10    Relevant Medications    lisinopril 10 mg tablet    Other Relevant Orders    Comprehensive Metabolic Panel    CBC and Auto Differential    Lipid Panel     Other Visit Diagnoses         Codes    Routine medical exam    -  Primary Z00.00    Relevant Orders    Comprehensive Metabolic Panel    CBC and Auto Differential    Lipid Panel    Hypothyroidism, unspecified     E03.9    Relevant Orders    TSH with reflex to Free T4 if abnormal    Screening for skin cancer     Z12.83    Relevant Orders    Referral to Dermatology    Screening for thyroid disorder     Z13.29             Discussed follow up of aneurysm , pt declines at this time, will call with labs , refilled meds ,doing well

## 2024-10-15 DIAGNOSIS — E03.9 HYPOTHYROIDISM, UNSPECIFIED: ICD-10-CM

## 2024-10-15 RX ORDER — LEVOTHYROXINE SODIUM 88 UG/1
88 TABLET ORAL
Qty: 90 TABLET | Refills: 1 | Status: SHIPPED | OUTPATIENT
Start: 2024-10-15 | End: 2025-04-13

## 2025-04-09 DIAGNOSIS — E03.9 HYPOTHYROIDISM, UNSPECIFIED: ICD-10-CM

## 2025-04-09 RX ORDER — LEVOTHYROXINE SODIUM 88 UG/1
88 TABLET ORAL
Qty: 90 TABLET | Refills: 1 | Status: SHIPPED | OUTPATIENT
Start: 2025-04-09 | End: 2025-10-06

## 2025-06-15 DIAGNOSIS — I10 ESSENTIAL HYPERTENSION: ICD-10-CM

## 2025-06-16 RX ORDER — LISINOPRIL 10 MG/1
10 TABLET ORAL DAILY
Qty: 90 TABLET | Refills: 2 | Status: SHIPPED | OUTPATIENT
Start: 2025-06-16

## (undated) DEVICE — APPLICATOR, CHLORAPREP, W/ORANGE TINT, 26ML

## (undated) DEVICE — DRESSING, ABDOMINAL, TENDERSORB, 8 X 7-1/2 IN, STERILE

## (undated) DEVICE — BANDAGE, ELASTIC, ACE, ACE, DOUBLE LENGTH, 6 X 550 IN, LF

## (undated) DEVICE — SOLUTION, INJECTION, USP, LACTATED RINGERS, LIFECARE, 1000ML

## (undated) DEVICE — BANDAGE, ESMARK, 4 IN X 12 FT, LF

## (undated) DEVICE — SOLUTION, IRRIGATION, X RX SODIUM CHL 0.9%, 1000ML BTL

## (undated) DEVICE — BANDAGE, COBAN 2, LAYER LITE COMPRESSION, 4 IN, LF

## (undated) DEVICE — BANDAGE, GAUZE, COTTON, STERILE, BULKEE II, 4.5IN X 4.1YD

## (undated) DEVICE — DRESSING, GAUZE, PETROLATUM, XEROFORM, 5 X 9 IN, STERILE

## (undated) DEVICE — DRILL BIT, 2.8 X 60M

## (undated) DEVICE — Device

## (undated) DEVICE — TUBING, SUCTION, 6MM X 10, CLEAN N-COND

## (undated) DEVICE — PADDING, WEBRIL, UNDERCAST, STERILE, 4 IN

## (undated) DEVICE — SYRINGE, 10 CC, LUER LOCK

## (undated) DEVICE — DRAPE, SHEET, U, W/ADHESIVE STRIP, IMPERVIOUS, 60 X 70 IN, DISPOSABLE, LF, STERILE

## (undated) DEVICE — PADDING, UNDERCAST, WEBRIL, 4 IN X 4 YD, REG, NS

## (undated) DEVICE — GLOVE, SURGICAL, PROTEXIS PI ORTHO, 8.0, PF, LF

## (undated) DEVICE — CAUTERY, PENCIL, PUSH BUTTON, SMOKE EVAC, 70MM

## (undated) DEVICE — DRAPE COVER, C ARM, FLOUROSCAN IMAGING SYS

## (undated) DEVICE — BANDAGE, ELASTIC, MATRIX, SELF-CLOSURE, 6 IN X 5 YD, LF

## (undated) DEVICE — SUTURE, MONOCRYL, 4-0, 27 IN, PS-2, UNDYED

## (undated) DEVICE — SUTURE, VICRYL, 3-0, 27 IN, SH

## (undated) DEVICE — DRAPE, C-ARM IMAGE

## (undated) DEVICE — SPONGE, GAUZE, AVANT, STERILE, NONWOVEN, 4PLY, 4 X 4, STANDARD

## (undated) DEVICE — DRILL BIT, 2.5 X 60M

## (undated) DEVICE — GOWN, ASTOUND, XL

## (undated) DEVICE — GLOVE, SURGICAL, PROTEXIS PI BLUE W/NEUTHERA, 8.0, PF, LF

## (undated) DEVICE — STOCKINETTE, IMPERVIOUS, 12 X 48 IN, LF, STERILE

## (undated) DEVICE — NEEDLE, HYPODERMIC, MONOJECT, 25 G X 1.5 IN, LUER LOCK HUB, RED